# Patient Record
Sex: FEMALE | Race: WHITE | NOT HISPANIC OR LATINO | URBAN - METROPOLITAN AREA
[De-identification: names, ages, dates, MRNs, and addresses within clinical notes are randomized per-mention and may not be internally consistent; named-entity substitution may affect disease eponyms.]

---

## 2019-09-16 ENCOUNTER — INPATIENT (INPATIENT)
Facility: HOSPITAL | Age: 74
LOS: 3 days | Discharge: HOME | DRG: 175 | End: 2019-09-20
Payer: MEDICARE

## 2019-09-16 VITALS
SYSTOLIC BLOOD PRESSURE: 168 MMHG | HEART RATE: 102 BPM | RESPIRATION RATE: 17 BRPM | DIASTOLIC BLOOD PRESSURE: 91 MMHG | OXYGEN SATURATION: 95 %

## 2019-09-16 DIAGNOSIS — R65.10 SYSTEMIC INFLAMMATORY RESPONSE SYNDROME (SIRS) OF NON-INFECTIOUS ORIGIN WITHOUT ACUTE ORGAN DYSFUNCTION: ICD-10-CM

## 2019-09-16 DIAGNOSIS — Z90.710 ACQUIRED ABSENCE OF BOTH CERVIX AND UTERUS: Chronic | ICD-10-CM

## 2019-09-16 DIAGNOSIS — R09.89 OTHER SPECIFIED SYMPTOMS AND SIGNS INVOLVING THE CIRCULATORY AND RESPIRATORY SYSTEMS: ICD-10-CM

## 2019-09-16 DIAGNOSIS — I26.92 SADDLE EMBOLUS OF PULMONARY ARTERY WITHOUT ACUTE COR PULMONALE: ICD-10-CM

## 2019-09-16 DIAGNOSIS — I26.99 OTHER PULMONARY EMBOLISM WITHOUT ACUTE COR PULMONALE: ICD-10-CM

## 2019-09-16 DIAGNOSIS — J96.00 ACUTE RESPIRATORY FAILURE, UNSPECIFIED WHETHER WITH HYPOXIA OR HYPERCAPNIA: ICD-10-CM

## 2019-09-16 DIAGNOSIS — R63.8 OTHER SYMPTOMS AND SIGNS CONCERNING FOOD AND FLUID INTAKE: ICD-10-CM

## 2019-09-16 DIAGNOSIS — C55 MALIGNANT NEOPLASM OF UTERUS, PART UNSPECIFIED: ICD-10-CM

## 2019-09-16 DIAGNOSIS — Z90.49 ACQUIRED ABSENCE OF OTHER SPECIFIED PARTS OF DIGESTIVE TRACT: Chronic | ICD-10-CM

## 2019-09-16 DIAGNOSIS — R60.0 LOCALIZED EDEMA: ICD-10-CM

## 2019-09-16 DIAGNOSIS — I10 ESSENTIAL (PRIMARY) HYPERTENSION: ICD-10-CM

## 2019-09-16 LAB
ALBUMIN SERPL ELPH-MCNC: 3.3 G/DL — SIGNIFICANT CHANGE UP (ref 3.3–5)
ALP SERPL-CCNC: 95 U/L — SIGNIFICANT CHANGE UP (ref 40–120)
ALT FLD-CCNC: 13 U/L — SIGNIFICANT CHANGE UP (ref 10–45)
ANION GAP SERPL CALC-SCNC: 11 MMOL/L — SIGNIFICANT CHANGE UP (ref 5–17)
APTT BLD: 119.6 SEC — CRITICAL HIGH (ref 27.5–36.3)
APTT BLD: 37.2 SEC — HIGH (ref 27.5–36.3)
APTT BLD: 45 SEC — HIGH (ref 27.5–36.3)
APTT BLD: 70.4 SEC — HIGH (ref 27.5–36.3)
AST SERPL-CCNC: 14 U/L — SIGNIFICANT CHANGE UP (ref 10–40)
BASOPHILS # BLD AUTO: 0.02 K/UL — SIGNIFICANT CHANGE UP (ref 0–0.2)
BASOPHILS NFR BLD AUTO: 0.2 % — SIGNIFICANT CHANGE UP (ref 0–2)
BILIRUB SERPL-MCNC: 1 MG/DL — SIGNIFICANT CHANGE UP (ref 0.2–1.2)
BLD GP AB SCN SERPL QL: NEGATIVE — SIGNIFICANT CHANGE UP
BUN SERPL-MCNC: 16 MG/DL — SIGNIFICANT CHANGE UP (ref 7–23)
CALCIUM SERPL-MCNC: 8.6 MG/DL — SIGNIFICANT CHANGE UP (ref 8.4–10.5)
CHLORIDE SERPL-SCNC: 105 MMOL/L — SIGNIFICANT CHANGE UP (ref 96–108)
CHOLEST SERPL-MCNC: 121 MG/DL — SIGNIFICANT CHANGE UP (ref 10–199)
CK MB CFR SERPL CALC: 2.7 NG/ML — SIGNIFICANT CHANGE UP (ref 0–6.7)
CK SERPL-CCNC: 40 U/L — SIGNIFICANT CHANGE UP (ref 25–170)
CO2 SERPL-SCNC: 23 MMOL/L — SIGNIFICANT CHANGE UP (ref 22–31)
CREAT SERPL-MCNC: 0.94 MG/DL — SIGNIFICANT CHANGE UP (ref 0.5–1.3)
D DIMER BLD IA.RAPID-MCNC: 1850 NG/ML DDU — HIGH
EOSINOPHIL # BLD AUTO: 0.11 K/UL — SIGNIFICANT CHANGE UP (ref 0–0.5)
EOSINOPHIL NFR BLD AUTO: 1.1 % — SIGNIFICANT CHANGE UP (ref 0–6)
GLUCOSE SERPL-MCNC: 148 MG/DL — HIGH (ref 70–99)
HBA1C BLD-MCNC: 6.1 % — HIGH (ref 4–5.6)
HCT VFR BLD CALC: 40.4 % — SIGNIFICANT CHANGE UP (ref 34.5–45)
HDLC SERPL-MCNC: 44 MG/DL — LOW
HGB BLD-MCNC: 12.7 G/DL — SIGNIFICANT CHANGE UP (ref 11.5–15.5)
IMM GRANULOCYTES NFR BLD AUTO: 0.5 % — SIGNIFICANT CHANGE UP (ref 0–1.5)
INR BLD: 1.24 — HIGH (ref 0.88–1.16)
LACTATE SERPL-SCNC: 1.1 MMOL/L — SIGNIFICANT CHANGE UP (ref 0.5–2)
LIPID PNL WITH DIRECT LDL SERPL: 66 MG/DL — SIGNIFICANT CHANGE UP
LYMPHOCYTES # BLD AUTO: 1.32 K/UL — SIGNIFICANT CHANGE UP (ref 1–3.3)
LYMPHOCYTES # BLD AUTO: 13.1 % — SIGNIFICANT CHANGE UP (ref 13–44)
MAGNESIUM SERPL-MCNC: 1.7 MG/DL — SIGNIFICANT CHANGE UP (ref 1.6–2.6)
MCHC RBC-ENTMCNC: 27 PG — SIGNIFICANT CHANGE UP (ref 27–34)
MCHC RBC-ENTMCNC: 31.4 GM/DL — LOW (ref 32–36)
MCV RBC AUTO: 86 FL — SIGNIFICANT CHANGE UP (ref 80–100)
MONOCYTES # BLD AUTO: 0.83 K/UL — SIGNIFICANT CHANGE UP (ref 0–0.9)
MONOCYTES NFR BLD AUTO: 8.2 % — SIGNIFICANT CHANGE UP (ref 2–14)
NEUTROPHILS # BLD AUTO: 7.77 K/UL — HIGH (ref 1.8–7.4)
NEUTROPHILS NFR BLD AUTO: 76.9 % — SIGNIFICANT CHANGE UP (ref 43–77)
NRBC # BLD: 0 /100 WBCS — SIGNIFICANT CHANGE UP (ref 0–0)
NT-PROBNP SERPL-SCNC: 2630 PG/ML — HIGH (ref 0–300)
PHOSPHATE SERPL-MCNC: 3 MG/DL — SIGNIFICANT CHANGE UP (ref 2.5–4.5)
PLATELET # BLD AUTO: 166 K/UL — SIGNIFICANT CHANGE UP (ref 150–400)
POTASSIUM SERPL-MCNC: 3.9 MMOL/L — SIGNIFICANT CHANGE UP (ref 3.5–5.3)
POTASSIUM SERPL-SCNC: 3.9 MMOL/L — SIGNIFICANT CHANGE UP (ref 3.5–5.3)
PROT SERPL-MCNC: 6.8 G/DL — SIGNIFICANT CHANGE UP (ref 6–8.3)
PROTHROM AB SERPL-ACNC: 14.1 SEC — HIGH (ref 10–12.9)
RBC # BLD: 4.7 M/UL — SIGNIFICANT CHANGE UP (ref 3.8–5.2)
RBC # FLD: 14.6 % — HIGH (ref 10.3–14.5)
RH IG SCN BLD-IMP: POSITIVE — SIGNIFICANT CHANGE UP
SODIUM SERPL-SCNC: 139 MMOL/L — SIGNIFICANT CHANGE UP (ref 135–145)
TOTAL CHOLESTEROL/HDL RATIO MEASUREMENT: 2.8 RATIO — LOW (ref 3.3–7.1)
TRIGL SERPL-MCNC: 53 MG/DL — SIGNIFICANT CHANGE UP (ref 10–149)
TROPONIN T SERPL-MCNC: <0.01 NG/ML — SIGNIFICANT CHANGE UP (ref 0–0.01)
TSH SERPL-MCNC: 2.79 UIU/ML — SIGNIFICANT CHANGE UP (ref 0.35–4.94)
WBC # BLD: 10.1 K/UL — SIGNIFICANT CHANGE UP (ref 3.8–10.5)
WBC # FLD AUTO: 10.1 K/UL — SIGNIFICANT CHANGE UP (ref 3.8–10.5)

## 2019-09-16 PROCEDURE — 93306 TTE W/DOPPLER COMPLETE: CPT | Mod: 26

## 2019-09-16 PROCEDURE — 93010 ELECTROCARDIOGRAM REPORT: CPT | Mod: 76,77

## 2019-09-16 PROCEDURE — 99222 1ST HOSP IP/OBS MODERATE 55: CPT | Mod: 57

## 2019-09-16 PROCEDURE — 93010 ELECTROCARDIOGRAM REPORT: CPT

## 2019-09-16 PROCEDURE — 93970 EXTREMITY STUDY: CPT | Mod: 26,59

## 2019-09-16 PROCEDURE — 99223 1ST HOSP IP/OBS HIGH 75: CPT | Mod: GC

## 2019-09-16 PROCEDURE — 71045 X-RAY EXAM CHEST 1 VIEW: CPT | Mod: 26

## 2019-09-16 PROCEDURE — 93970 EXTREMITY STUDY: CPT | Mod: 26

## 2019-09-16 RX ORDER — ACETAMINOPHEN 500 MG
650 TABLET ORAL ONCE
Refills: 0 | Status: COMPLETED | OUTPATIENT
Start: 2019-09-16 | End: 2019-09-16

## 2019-09-16 RX ORDER — CALAMINE AND ZINC OXIDE AND PHENOL 160; 10 MG/ML; MG/ML
1 LOTION TOPICAL THREE TIMES A DAY
Refills: 0 | Status: DISCONTINUED | OUTPATIENT
Start: 2019-09-16 | End: 2019-09-20

## 2019-09-16 RX ORDER — HEPARIN SODIUM 5000 [USP'U]/ML
2600 INJECTION INTRAVENOUS; SUBCUTANEOUS
Qty: 25000 | Refills: 0 | Status: DISCONTINUED | OUTPATIENT
Start: 2019-09-16 | End: 2019-09-16

## 2019-09-16 RX ORDER — POTASSIUM CHLORIDE 20 MEQ
10 PACKET (EA) ORAL ONCE
Refills: 0 | Status: COMPLETED | OUTPATIENT
Start: 2019-09-16 | End: 2019-09-16

## 2019-09-16 RX ORDER — HEPARIN SODIUM 5000 [USP'U]/ML
2400 INJECTION INTRAVENOUS; SUBCUTANEOUS
Qty: 25000 | Refills: 0 | Status: DISCONTINUED | OUTPATIENT
Start: 2019-09-16 | End: 2019-09-16

## 2019-09-16 RX ORDER — MAGNESIUM SULFATE 500 MG/ML
2 VIAL (ML) INJECTION ONCE
Refills: 0 | Status: COMPLETED | OUTPATIENT
Start: 2019-09-16 | End: 2019-09-16

## 2019-09-16 RX ORDER — INFLUENZA VIRUS VACCINE 15; 15; 15; 15 UG/.5ML; UG/.5ML; UG/.5ML; UG/.5ML
0.5 SUSPENSION INTRAMUSCULAR ONCE
Refills: 0 | Status: COMPLETED | OUTPATIENT
Start: 2019-09-16 | End: 2019-09-16

## 2019-09-16 RX ORDER — HEPARIN SODIUM 5000 [USP'U]/ML
3000 INJECTION INTRAVENOUS; SUBCUTANEOUS
Qty: 25000 | Refills: 0 | Status: DISCONTINUED | OUTPATIENT
Start: 2019-09-16 | End: 2019-09-17

## 2019-09-16 RX ADMIN — HEPARIN SODIUM 24 UNIT(S)/HR: 5000 INJECTION INTRAVENOUS; SUBCUTANEOUS at 05:41

## 2019-09-16 RX ADMIN — Medication 650 MILLIGRAM(S): at 23:27

## 2019-09-16 RX ADMIN — Medication 50 GRAM(S): at 16:18

## 2019-09-16 RX ADMIN — Medication 650 MILLIGRAM(S): at 22:31

## 2019-09-16 RX ADMIN — Medication 10 MILLIEQUIVALENT(S): at 16:18

## 2019-09-16 NOTE — CONSULT NOTE ADULT - ASSESSMENT
74yoF with PMH of HTN (not on medications) and uterine CA (s/p CHRISTIE, chemotherapy, and radiation, last chemo tx 3-4yrs ago), and surgical hx of cholecystectomy, SBOx2, and CHRISTIE), who was transferred to [facility] from [facility] after found to have submassive PE on imaging.    -NPO after midnight for possible thrombectomy 9/17  -continue anticoagulation  -d/w attending 74yoF with PMH of HTN (not on medications) and uterine CA (s/p CHRISTIE, chemotherapy, and radiation, last chemo tx 3-4yrs ago), and surgical hx of cholecystectomy, SBOx2, and CHRISTIE), who was transferred to [facility] from [facility] after found to have submassive PE on imaging.    -NPO after midnight for possible thrombectomy 9/17  -continue anticoagulation  -f/u echo  -f/u LE doppler  -d/w attending

## 2019-09-16 NOTE — CONSULT NOTE ADULT - SUBJECTIVE AND OBJECTIVE BOX
Vascular Attending:  Micheal      HPI:  Mrs. Escobar is a 75yo F with PMH of HTN (not on medications) and uterine CA (s/p CHRISTIE, chemotherapy, and radiation, last chemo tx 3-4yrs ago), and surgical hx of cholecystectomy, SBOx2, and CHRISTIE), who was transferred to [facility] from [facility] after found to have submassive PE on imaging. Pt initially presented to [facility] overnight c/o 1 wk of SOB and fatigue, and stabbing back pain x1 day. This has never happened to her before. She endorsed that 1 wk ago she woke up with shortness of breath and a "fluttering feeling" in her chest. Intensity of SOB has been constant since onset, aggravated by movement, w/o alleviating factors. Pt reports feeling SOB upon ambulating to bathroom (at baseline can walk ~1 blocks before feeling SOB) . She reports that yesterday she began to have a stabbing feeling in her L shoulder blade like a knife radiating from back to front (assoc w/ inspiration, aggravated w/ movement, no alleviating factors), and came to the hospital at her family's insistence. She endorses increased b/l leg swelling from baseline. Pt denies recent travel, recent illness, sick contacts. ROS significant for diarrhea (1 episode yesterday, 1 episode today, non-bloody) and numbness & tingling in her hands and feet. In regards to PMH, pt has not been to a doctor in several years. She is not taking anti-hypertensives because she ran out of medicine and never followed up.     At [facility]:      -VS in ED: T101.3 rectal, , /90, RR35, O2sat 88% on RA. Placed on 2L NC with O2sat improving to 95%.     -Labs significant for WBC12.44 (w/ neutrophilic predominance), Hb/Hct 14.9/46.7, BUN/Cr 20/1.26. D-dimer 3129, Trop I 0.031, lactate 2.4. ABG pH 7.44, pCO2 30, pO2 77, HCO3 19.6.    -EKG w/ sinus tachycardia and RBBB    -CTA w/ large saddle embolus, signs of R heart strain, and signs of early pulmonary infarction    -For suspected sepsis, given ceftriaxone 2g, azithromycin 500mg, and 2.7L lactated ringers. Also given ASA 162mg, and tylenol 650mg. She was started on heparin prior to transfer to [facility], (16 Sep 2019 05:09)    Vascular consulted for submassive PE no imaging done in house.  Patient is on heparin drip.    PAST MEDICAL & SURGICAL HISTORY:  H/O small bowel obstruction: x2  Uterine cancer: s/p CHRISTIE, chemotherapy, radiation  HTN (hypertension)  S/P cholecystectomy  S/P total abdominal hysterectomy      MEDICATIONS  (STANDING):  heparin  Infusion. 2600 Unit(s)/Hr (26 mL/Hr) IV Continuous <Continuous>  influenza   Vaccine 0.5 milliLiter(s) IntraMuscular once  magnesium sulfate  IVPB 2 Gram(s) IV Intermittent once  potassium chloride    Tablet ER 10 milliEquivalent(s) Oral once    MEDICATIONS  (PRN):  calamine Lotion 1 Application(s) Topical three times a day PRN Rash and/or Itching      Allergies    No Known Allergies    Intolerances        SOCIAL HISTORY:    FAMILY HISTORY:  FHx: heart failure: mother, father  FHx: pancreatic cancer: brother,  age 58  FHx: thyroid cancer: mother  FHx: prostate cancer: father      Vital Signs Last 24 Hrs  T(C): 36.2 (16 Sep 2019 09:15), Max: 36.6 (16 Sep 2019 06:50)  T(F): 97.2 (16 Sep 2019 09:15), Max: 97.9 (16 Sep 2019 06:50)  HR: 92 (16 Sep 2019 08:40) (84 - 102)  BP: 167/109 (16 Sep 2019 08:40) (161/101 - 168/91)  BP(mean): 133 (16 Sep 2019 08:40) (123 - 133)  RR: 18 (16 Sep 2019 08:40) (17 - 18)  SpO2: 94% (16 Sep 2019 08:40) (94% - 95%)    PHYSICAL EXAM:      Constitutional:    Respiratory:    Cardiovascular:    Gastrointestinal:    Extremities:    Vascular:          LABS:                        12.7   10.10 )-----------( 166      ( 16 Sep 2019 05:47 )             40.4     09-16    139  |  105  |  16  ----------------------------<  148<H>  3.9   |  23  |  0.94    Ca    8.6      16 Sep 2019 05:47  Phos  3.0     -  Mg     1.7         TPro  6.8  /  Alb  3.3  /  TBili  1.0  /  DBili  x   /  AST  14  /  ALT  13  /  AlkPhos  95  -    PT/INR - ( 16 Sep 2019 05:47 )   PT: 14.1 sec;   INR: 1.24          PTT - ( 16 Sep 2019 10:45 )  PTT:45.0 sec      RADIOLOGY & ADDITIONAL STUDIES Vascular Attending:  Micheal      HPI:  Mrs. Escobar is a 73yo F with PMH of HTN (not on medications) and uterine CA (s/p CHRISTIE, chemotherapy, and radiation, last chemo tx 3-4yrs ago), and surgical hx of cholecystectomy, SBOx2, and CHRISTIE), who was transferred to [facility] from [facility] after found to have submassive PE on imaging. Pt initially presented to [facility] overnight c/o 1 wk of SOB and fatigue, and stabbing back pain x1 day. This has never happened to her before. She endorsed that 1 wk ago she woke up with shortness of breath and a "fluttering feeling" in her chest. Intensity of SOB has been constant since onset, aggravated by movement, w/o alleviating factors. Pt reports feeling SOB upon ambulating to bathroom (at baseline can walk ~1 blocks before feeling SOB) . She reports that yesterday she began to have a stabbing feeling in her L shoulder blade like a knife radiating from back to front (assoc w/ inspiration, aggravated w/ movement, no alleviating factors), and came to the hospital at her family's insistence. She endorses increased b/l leg swelling from baseline. Pt denies recent travel, recent illness, sick contacts. ROS significant for diarrhea (1 episode yesterday, 1 episode today, non-bloody) and numbness & tingling in her hands and feet. In regards to PMH, pt has not been to a doctor in several years. She is not taking anti-hypertensives because she ran out of medicine and never followed up.     At [facility]:      -VS in ED: T101.3 rectal, , /90, RR35, O2sat 88% on RA. Placed on 2L NC with O2sat improving to 95%.     -Labs significant for WBC12.44 (w/ neutrophilic predominance), Hb/Hct 14.9/46.7, BUN/Cr 20/1.26. D-dimer 3129, Trop I 0.031, lactate 2.4. ABG pH 7.44, pCO2 30, pO2 77, HCO3 19.6.    -EKG w/ sinus tachycardia and RBBB    -CTA w/ large saddle embolus, signs of R heart strain, and signs of early pulmonary infarction    -For suspected sepsis, given ceftriaxone 2g, azithromycin 500mg, and 2.7L lactated ringers. Also given ASA 162mg, and tylenol 650mg. She was started on heparin prior to transfer to [facility], (16 Sep 2019 05:09)    Vascular consulted for submassive PE no imaging done in house.  Patient is on heparin drip. Patient states she becomes SOB when doing anything active.  Patient denies cp/sob/n/v.    PAST MEDICAL & SURGICAL HISTORY:  H/O small bowel obstruction: x2  Uterine cancer: s/p CHRISTIE, chemotherapy, radiation  HTN (hypertension)  S/P cholecystectomy  S/P total abdominal hysterectomy      MEDICATIONS  (STANDING):  heparin  Infusion. 2600 Unit(s)/Hr (26 mL/Hr) IV Continuous <Continuous>  influenza   Vaccine 0.5 milliLiter(s) IntraMuscular once  magnesium sulfate  IVPB 2 Gram(s) IV Intermittent once  potassium chloride    Tablet ER 10 milliEquivalent(s) Oral once    MEDICATIONS  (PRN):  calamine Lotion 1 Application(s) Topical three times a day PRN Rash and/or Itching      Allergies    No Known Allergies    Intolerances        SOCIAL HISTORY:    FAMILY HISTORY:  FHx: heart failure: mother, father  FHx: pancreatic cancer: brother,  age 58  FHx: thyroid cancer: mother  FHx: prostate cancer: father      Vital Signs Last 24 Hrs  T(C): 36.2 (16 Sep 2019 09:15), Max: 36.6 (16 Sep 2019 06:50)  T(F): 97.2 (16 Sep 2019 09:15), Max: 97.9 (16 Sep 2019 06:50)  HR: 92 (16 Sep 2019 08:40) (84 - 102)  BP: 167/109 (16 Sep 2019 08:40) (161/101 - 168/91)  BP(mean): 133 (16 Sep 2019 08:40) (123 - 133)  RR: 18 (16 Sep 2019 08:40) (17 - 18)  SpO2: 94% (16 Sep 2019 08:40) (94% - 95%)    PHYSICAL EXAM:      Constitutional: NAD    Respiratory: nonlabored breathing on nasal canula     Cardiovascular: s1s2 rrr    Gastrointestinal: soft nt/nd    Extremities: b/l LE1 WWP swelling    Vascular: b/l LE 2+ pal DP triphasic PT          LABS:                        12.7   10.10 )-----------( 166      ( 16 Sep 2019 05:47 )             40.4     -    139  |  105  |  16  ----------------------------<  148<H>  3.9   |  23  |  0.94    Ca    8.6      16 Sep 2019 05:47  Phos  3.0     -  Mg     1.7         TPro  6.8  /  Alb  3.3  /  TBili  1.0  /  DBili  x   /  AST  14  /  ALT  13  /  AlkPhos  95  -    PT/INR - ( 16 Sep 2019 05:47 )   PT: 14.1 sec;   INR: 1.24          PTT - ( 16 Sep 2019 10:45 )  PTT:45.0 sec      RADIOLOGY & ADDITIONAL STUDIES

## 2019-09-16 NOTE — H&P ADULT - PROBLEM SELECTOR PLAN 5
F: Not indicated at this time  E: Replete PRN; K>4, Mg>2  N: NPO pending possible intervention    FULL CODE    DVT PPx: Heparin gtt

## 2019-09-16 NOTE — H&P ADULT - ASSESSMENT
Mrs. Escobar is a 73yo F with PMH of HTN (not on medications) and uterine CA (s/p CHRISTIE, chemotherapy, and radiation, last chemo tx 3-4yrs ago) who was transferred to [facility] from [facility] after found to have submassive PE on imaging. Pt initially presented to [facility] overnight c/o 1 wk of SOB and fatigue, and stabbing back pain x1 day. At [facility], treated w/ 2.7L IVF (LR), ceftriaxone 2g and azithromycin 500mg for suspected sepsis, as well as ASA 162mg and Tylenol 650mg. D dimer 3129. Found to have saddle pulmonary embolus on CTA, started on heparin gtt, and transferred to [facility] for medical management. Pt admitted to [facility] for telemetry monitoring and medical management of PE. Etiology of PE likely 2/2 DVT in the setting of LLE pain and swelling. Pt also w/ risk factors including decreased mobility, obesity, PMH of uterine CA.

## 2019-09-16 NOTE — H&P ADULT - NSHPPHYSICALEXAM_GEN_ALL_CORE
VITAL SIGNS:  T(C): --  HR: 102 (09-16-19 @ 04:45) (102 - 102)  BP: 168/91 (09-16-19 @ 04:45) (168/91 - 168/91)  RR: 17 (09-16-19 @ 04:45) (17 - 17)  SpO2: 95% (09-16-19 @ 04:45) (95% - 95%)    PHYSICAL EXAM:  Constitutional: obese, comfortable in bed; NAD  Neurologic: AAOx3  HEENT: NC/AT; EOMI, anicteric sclera; on nasal cannula at 2LPM, no nasal discharge; MMM, poor dentition  Neck: supple  Respiratory: CTA B/L, no wheezes/rales/rhonchi, no diminished breath sounds, unable to complete full sentences without stopping to catch her breath, no accessory muscle use  Cardiac: +S1/S2, no murmurs/rubs/gallops, RRR  Gastrointestinal: obese abdomen; tenderness to palpation in RLQ and LLQ, no rebound or guarding, no organomegaly, +BSx4; well-healed surgical scars  Genitourinary: no suprapubic tenderness, pirmafit in place  Extremities: 3+ LLE pedal edema including malleolus, RLE w/o pitting edema, LE tender to palpation b/l; RUE w/ erythema and redness nontender to palpation w/o fluctuance  Vascular: 2+ radial, femoral, DP/PT pulses B/L  Dermatologic: skin warm, dry and intact  Psychiatric: affect and characteristics of appearance, verbalizations, behaviors are appropriate

## 2019-09-16 NOTE — CONSULT NOTE ADULT - SUBJECTIVE AND OBJECTIVE BOX
PULMONARY SERVICE INITIAL CONSULT NOTE    HPI:  Mrs. Escobar is a 73yo F with PMH of HTN (not on medications) and uterine CA (s/p CHRISTIE, chemotherapy, and radiation, last chemo tx 3-4yrs ago), and surgical hx of cholecystectomy, SBOx2, and CHRISTIE), who was transferred to [facility] from [facility] after found to have submassive PE on imaging. Pt initially presented to [facility] overnight c/o 1 wk of SOB and fatigue, and stabbing back pain x1 day. This has never happened to her before. She endorsed that 1 wk ago she woke up with shortness of breath and a "fluttering feeling" in her chest. Intensity of SOB has been constant since onset, aggravated by movement, w/o alleviating factors. Pt reports feeling SOB upon ambulating to bathroom (at baseline can walk ~1 blocks before feeling SOB) . She reports that yesterday she began to have a stabbing feeling in her L shoulder blade like a knife radiating from back to front (assoc w/ inspiration, aggravated w/ movement, no alleviating factors), and came to the hospital at her family's insistence. She endorses increased b/l leg swelling from baseline. Pt denies recent travel, recent illness, sick contacts. ROS significant for diarrhea (1 episode yesterday, 1 episode today, non-bloody) and numbness & tingling in her hands and feet. In regards to PMH, pt has not been to a doctor in several years. She is not taking anti-hypertensives because she ran out of medicine and never followed up.     At [facility]:      -VS in ED: T101.3 rectal, , /90, RR35, O2sat 88% on RA. Placed on 2L NC with O2sat improving to 95%.     -Labs significant for WBC12.44 (w/ neutrophilic predominance), Hb/Hct 14.9/46.7, BUN/Cr 20/1.26. D-dimer 3129, Trop I 0.031, lactate 2.4. ABG pH 7.44, pCO2 30, pO2 77, HCO3 19.6.    -EKG w/ sinus tachycardia and RBBB    -CTA w/ large saddle embolus, signs of R heart strain, and signs of early pulmonary infarction    -For suspected sepsis, given ceftriaxone 2g, azithromycin 500mg, and 2.7L lactated ringers. Also given ASA 162mg, and tylenol 650mg. She was started on heparin prior to transfer to [facility], (16 Sep 2019 05:09)      REVIEW OF SYSTEMS:  Constitutional: No fever, weight loss or fatigue  Eyes: No eye pain, visual disturbances, or discharge  ENMT:  No difficulty hearing, tinnitus, vertigo; No sinus or throat pain  Neck: No pain, stiffness or neck swelling  Respiratory: see HPI  Cardiovascular: No chest pain, palpitations, dizziness or leg swelling  Gastrointestinal: No abdominal or epigastric pain. No nausea, vomiting or hematemesis; No diarrhea or constipation. No melena or hematochezia.  Genitourinary: No dysuria, frequency, hematuria or incontinence  Neurological: No headaches, memory loss, loss of strength, numbness or tremors  Skin: No itching, burning, rashes or lesions   Lymph Nodes: No enlarged glands  Endocrine: No heat or cold intolerance; No hair loss  Musculoskeletal: No joint pain or swelling; No muscle, back or extremity pain  Psychiatric: No depression, anxiety, mood swings or difficulty sleeping  Heme/Lymph: No easy bruising or bleeding gums  Allergy and Immunologic: No hives or eczema    PAST MEDICAL & SURGICAL HISTORY:  H/O small bowel obstruction: x2  Uterine cancer: s/p CHRISTIE, chemotherapy, radiation  HTN (hypertension)  S/P cholecystectomy  S/P total abdominal hysterectomy      FAMILY HISTORY:  FHx: heart failure: mother, father  FHx: pancreatic cancer: brother,  age 58  FHx: thyroid cancer: mother  FHx: prostate cancer: father      SOCIAL HISTORY:  Smoking Status: [ ] Current, [ ] Former, [ ] Never  Pack Years:    MEDICATIONS:  Pulmonary:    Antimicrobials:    Anticoagulants:    Onc:    GI/:    Endocrine:    Cardiac:    Other Medications:  calamine Lotion 1 Application(s) Topical three times a day PRN  influenza   Vaccine 0.5 milliLiter(s) IntraMuscular once  magnesium sulfate  IVPB 2 Gram(s) IV Intermittent once  potassium chloride    Tablet ER 10 milliEquivalent(s) Oral once      Allergies    No Known Allergies    Intolerances        Vital Signs Last 24 Hrs  T(C): 36.2 (16 Sep 2019 09:15), Max: 36.6 (16 Sep 2019 06:50)  T(F): 97.2 (16 Sep 2019 09:15), Max: 97.9 (16 Sep 2019 06:50)  HR: 92 (16 Sep 2019 08:40) (84 - 102)  BP: 167/109 (16 Sep 2019 08:40) (161/101 - 168/91)  BP(mean): 133 (16 Sep 2019 08:40) (123 - 133)  RR: 18 (16 Sep 2019 08:40) (17 - 18)  SpO2: 94% (16 Sep 2019 08:40) (94% - 95%)    09-15 @ 07:01  -   @ 07:00  --------------------------------------------------------  IN: 48 mL / OUT: 0 mL / NET: 48 mL          PHYSICAL EXAM:  Constitutional: WDWN  Head: NC/AT  EENT: PERRL, anicteric sclera; oropharynx clear, MMM  Neck: supple, no appreciable JVD  Respiratory: CTA B/L; no W/R/R  Cardiovascular: +S1/S2, RRR  Gastrointestinal: soft, NT/ND; +BSx4  Extremities: WWP; no edema, clubbing or cyanosis  Vascular: 2+ radial, DP/PT pulses B/L  Neurological: AAOx3; no focal deficits    LABS:      CBC Full  -  ( 16 Sep 2019 05:47 )  WBC Count : 10.10 K/uL  RBC Count : 4.70 M/uL  Hemoglobin : 12.7 g/dL  Hematocrit : 40.4 %  Platelet Count - Automated : 166 K/uL  Mean Cell Volume : 86.0 fl  Mean Cell Hemoglobin : 27.0 pg  Mean Cell Hemoglobin Concentration : 31.4 gm/dL  Auto Neutrophil # : 7.77 K/uL  Auto Lymphocyte # : 1.32 K/uL  Auto Monocyte # : 0.83 K/uL  Auto Eosinophil # : 0.11 K/uL  Auto Basophil # : 0.02 K/uL  Auto Neutrophil % : 76.9 %  Auto Lymphocyte % : 13.1 %  Auto Monocyte % : 8.2 %  Auto Eosinophil % : 1.1 %  Auto Basophil % : 0.2 %        139  |  105  |  16  ----------------------------<  148<H>  3.9   |  23  |  0.94    Ca    8.6      16 Sep 2019 05:47  Phos  3.0     -  Mg     1.7         TPro  6.8  /  Alb  3.3  /  TBili  1.0  /  DBili  x   /  AST  14  /  ALT  13  /  AlkPhos  95  -    PT/INR - ( 16 Sep 2019 05:47 )   PT: 14.1 sec;   INR: 1.24          PTT - ( 16 Sep 2019 10:45 )  PTT:45.0 sec                  RADIOLOGY & ADDITIONAL STUDIES:

## 2019-09-16 NOTE — H&P ADULT - PROBLEM SELECTOR PLAN 3
Pt with reported Hx of HTN, states she used to take medication, but stopped 2-3 years ago when she stopped seeing a doctor regularly. BP on arrival to [facility] 183/90.  - Monitor BP for now  - Would consider starting anti-hypertensive if HTN persists

## 2019-09-16 NOTE — H&P ADULT - HISTORY OF PRESENT ILLNESS
Mrs. Escobar is a 75yo F with PMH of HTN (not on medications) and uterine CA (s/p CHRISTIE, chemotherapy, and radiation, last chemo tx 3-4yrs ago), and surgical hx of cholecystectomy, SBOx2, and CHRISTIE), who was transferred to [facility] from [facility] after found to have submassive PE on imaging. Pt initially presented to [facility] overnight c/o 1 wk of SOB and fatigue, and stabbing back pain x1 day. This has never happened to her before. She endorsed that 1 wk ago she woke up with shortness of breath and a "fluttering feeling" in her chest. Intensity of SOB has been constant since onset, aggravated by movement, w/o alleviating factors. Pt reports feeling SOB upon ambulating to bathroom (at baseline can walk ~1 blocks before feeling SOB) . She reports that yesterday she began to have a stabbing feeling in her L shoulder blade like a knife radiating from back to front (assoc w/ inspiration, aggravated w/ movement, no alleviating factors), and came to the hospital at her family's insistence.    Pt endorses increased b/l leg swelling from baseline,     Pt denies recent travel, recent illness, sick contacts,   In regards to PMH, pt has not been to a doctor in several years. She is not taking anti-hypertensives because she ran out of medicine and never followed up.    At [facility]:      -VS in ED: T101.3 rectal, , /90, RR35, O2sat 88% on RA. Placed on 2L NC with O2sat improving to 95%.     -Labs significant for WBC12.44 (w/ neutrophilic predominance), Hb/Hct 14.9/46.7, BUN/Cr 20/1.26. D-dimer 3129, Trop I 0.031, lactate 2.4. ABG pH 7.44, pCO2 30, pO2 77, HCO3 19.6.    -EKG w/ sinus tachycardia and RBBB    -CTA w/ large saddle embolus, signs of R heart strain, and signs of early pulmonary infarction    -For suspected sepsis, given ceftriaxone 2g, azithromycin 500mg, and 2.7L lactated ringers. Also given ASA 162mg, and tylenol 650mg Mrs. Escobar is a 73yo F with PMH of HTN (not on medications) and uterine CA (s/p CHRISTIE, chemotherapy, and radiation, last chemo tx 3-4yrs ago), and surgical hx of cholecystectomy, SBOx2, and CHRISTIE), who was transferred to [facility] from [facility] after found to have submassive PE on imaging. Pt initially presented to [facility] overnight c/o 1 wk of SOB and fatigue, and stabbing back pain x1 day. This has never happened to her before. She endorsed that 1 wk ago she woke up with shortness of breath and a "fluttering feeling" in her chest. Intensity of SOB has been constant since onset, aggravated by movement, w/o alleviating factors. Pt reports feeling SOB upon ambulating to bathroom (at baseline can walk ~1 blocks before feeling SOB) . She reports that yesterday she began to have a stabbing feeling in her L shoulder blade like a knife radiating from back to front (assoc w/ inspiration, aggravated w/ movement, no alleviating factors), and came to the hospital at her family's insistence. She endorses increased b/l leg swelling from baseline. Pt denies recent travel, recent illness, sick contacts. ROS significant for diarrhea (1 episode yesterday, 1 episode today, non-bloody) and numbness & tingling in her hands and feet. In regards to PMH, pt has not been to a doctor in several years. She is not taking anti-hypertensives because she ran out of medicine and never followed up.     At [facility]:      -VS in ED: T101.3 rectal, , /90, RR35, O2sat 88% on RA. Placed on 2L NC with O2sat improving to 95%.     -Labs significant for WBC12.44 (w/ neutrophilic predominance), Hb/Hct 14.9/46.7, BUN/Cr 20/1.26. D-dimer 3129, Trop I 0.031, lactate 2.4. ABG pH 7.44, pCO2 30, pO2 77, HCO3 19.6.    -EKG w/ sinus tachycardia and RBBB    -CTA w/ large saddle embolus, signs of R heart strain, and signs of early pulmonary infarction    -For suspected sepsis, given ceftriaxone 2g, azithromycin 500mg, and 2.7L lactated ringers. Also given ASA 162mg, and tylenol 650mg. She was started on heparin prior to transfer to [facility],

## 2019-09-16 NOTE — H&P ADULT - PROBLEM SELECTOR PLAN 4
Hx of uterine cancer, underwent chemoradiation and hysterectomy 3-4 years ago. Has not followed with an oncologist since  - not currently an active issue

## 2019-09-16 NOTE — H&P ADULT - PROBLEM SELECTOR PLAN 2
Pt presented to [facility] meeting SIRS criteria with T 101.3 WBC 12.44, , lactate 2.4. Unclear source of infection during initial workup. Pt was given tylenol, zithromax, rocephin, asa, 2.7L LR. Though pt met SIRS criteria, the fever, leukocytosis, tachycardia and lactate are most likely secondary to PE  - would not continue antibiotics at this time without clear infectious source  - consider sepsis workup if pt spikes or develops signs/symptoms of infection  - f/u repeat lactate

## 2019-09-16 NOTE — H&P ADULT - NSHPLABSRESULTS_GEN_ALL_CORE
LABS FROM LABS:                        12.7   10.10 )-----------( 166      ( 16 Sep 2019 05:47 )             40.4       Lactate: 1.1 LABS:                        12.7   10.10 )-----------( 166      ( 16 Sep 2019 05:47 )             40.4     09-16    139  |  105  |  16  ----------------------------<  148<H>  3.9   |  23  |  0.94    Ca    8.6      16 Sep 2019 05:47  Phos  3.0     09-16  Mg     1.7     09-16    TPro  6.8  /  Alb  3.3  /  TBili  1.0  /  DBili  x   /  AST  14  /  ALT  13  /  AlkPhos  95  09-16      PT/INR - ( 16 Sep 2019 05:47 )   PT: 14.1 sec;   INR: 1.24     PTT - ( 16 Sep 2019 05:47 )  PTT:119.6 sec      CARDIAC MARKERS ( 16 Sep 2019 05:47 )  x     / <0.01 ng/mL / 40 U/L / x     / 2.7 ng/mL      Lactate, Blood: 1.1 mmoL/L (09.16.19 @ 05:46)      RADIOLOGY, EKG AND ADDITIONAL TESTS: Reviewed. LABS:                        12.7   10.10 )-----------( 166      ( 16 Sep 2019 05:47 )             40.4     09-16    139  |  105  |  16  ----------------------------<  148<H>  3.9   |  23  |  0.94    Ca    8.6      16 Sep 2019 05:47  Phos  3.0     09-16  Mg     1.7     09-16    TPro  6.8  /  Alb  3.3  /  TBili  1.0  /  DBili  x   /  AST  14  /  ALT  13  /  AlkPhos  95  09-16      PT/INR - ( 16 Sep 2019 05:47 )   PT: 14.1 sec;   INR: 1.24     PTT - ( 16 Sep 2019 05:47 )  PTT:119.6 sec      CARDIAC MARKERS ( 16 Sep 2019 05:47 )  x     / <0.01 ng/mL / 40 U/L / x     / 2.7 ng/mL      Lactate, Blood: 1.1 mmoL/L (09.16.19 @ 05:46)    Serum Pro-Brain Natriuretic Peptide (09.16.19 @ 05:47): 2630      RADIOLOGY, EKG AND ADDITIONAL TESTS: Reviewed.

## 2019-09-16 NOTE — H&P ADULT - NSICDXFAMILYHX_GEN_ALL_CORE_FT
FAMILY HISTORY:  FHx: heart failure, mother, father  FHx: pancreatic cancer, brother,  age 58  FHx: prostate cancer, father  FHx: thyroid cancer, mother

## 2019-09-16 NOTE — CONSULT NOTE ADULT - PROBLEM SELECTOR RECOMMENDATION 3
high suspicion that etiology of VTE might be related to her hx of cancer perhaps recurrance now. would need a follow up with heme onc.

## 2019-09-16 NOTE — PROGRESS NOTE ADULT - PROBLEM SELECTOR PLAN 1
Pt presenting from [facility] with CT finding of acute saddle PE w/ signs of R heart strain and early pulmonary infarct in the setting of one week of dyspnea on exertion and a sharp pain radiating from the back of the R shoulder through to the chest. D-dimer at [facility] 3129. Pt was started on heparin at an unclear rate and transferred to [facility] for further management. Pt w/ hx of uterine cancer, for which she underwent chemoradiation and a hysterectomy. On admission to [facility], pt with  Hx of malignancy, and Wells score 2.5. Pt denies recent surgery, trauma, kidney disease, liver disease, recent travel, prolonged immobility, history of PE/DVT, or use of medications that could increase risk of DVT/PE. Risk factors include obesity and history of malignancy.  -elevated BNP likely 2/2 R heart strain  - no repeat CTA PE protocol needed here  - b/l duplex both lower and upper extremities ordered, pending results  - started on heparin at 24mL/hr, increased up to 30cc/hr tonight given subtherapeutic PTTs  - trend PTT q4-6  - monitor closely for HD changes  - pulm and vascular consulted; pt NPO after midnight for potential thrombectomy the next morning  - TTE performed 9/16; LVEF 55%, no R heart strain, normal RA and RV size, normal PASP  - continue supplemental O2 with NC to maintain O2 >94%

## 2019-09-16 NOTE — PROGRESS NOTE ADULT - ASSESSMENT
Mrs. Escobar is a 73yo F with PMH of HTN (not on medications) and uterine CA (s/p CHRISTIE, chemotherapy, and radiation, last chemo tx 3-4yrs ago) who was transferred to [facility] from [facility] after found to have submassive PE on imaging. Pt initially presented to [facility] overnight c/o 1 wk of SOB and fatigue, and stabbing back pain x1 day. At [facility], treated w/ 2.7L IVF (LR), ceftriaxone 2g and azithromycin 500mg for suspected sepsis, as well as ASA 162mg and Tylenol 650mg. D dimer 3129. Found to have saddle pulmonary embolus on CTA, started on heparin gtt, and transferred to [facility] for medical management. Pt admitted to [facility] for telemetry monitoring and medical management of PE. Etiology of PE likely 2/2 DVT in the setting of LLE pain and swelling. Pt also w/ risk factors including decreased mobility, obesity, PMH of uterine CA. 73yo F with PMH of HTN (not on medications) and uterine CA (s/p CHRISTIE, chemotherapy, and radiation, last chemo tx 3-4yrs ago) who was transferred to [facility] from [facility] after found to have submassive PE on imaging. Started on heparin gtt, and transferred to [facility] for medical management. Pt admitted to [facility] for telemetry monitoring and medical management of PE.

## 2019-09-16 NOTE — H&P ADULT - NSICDXPASTMEDICALHX_GEN_ALL_CORE_FT
PAST MEDICAL HISTORY:  H/O small bowel obstruction x2    HTN (hypertension)     Uterine cancer s/p CHRISTIE, chemotherapy, radiation

## 2019-09-16 NOTE — PROGRESS NOTE ADULT - SUBJECTIVE AND OBJECTIVE BOX
SUBJECTIVE/INTERVAL HPI:  Saw and examined patient at bedside this AM. Patient reports no new complaints. She still reports pleuritic chest pain on deep inspiration and L shoulder pain. Still endorses lower extremity pain with swelling. Patient notes that the lower extremity edema started after her radiation for uterine cancer in 2015. No CP at rest, fever, chills, cough, dysuria.    VITAL SIGNS:  Vital Signs Last 24 Hrs  T(C): 36.6 (16 Sep 2019 06:50), Max: 36.6 (16 Sep 2019 06:50)  T(F): 97.9 (16 Sep 2019 06:50), Max: 97.9 (16 Sep 2019 06:50)  HR: 102 (16 Sep 2019 04:45) (102 - 102)  BP: 168/91 (16 Sep 2019 04:45) (168/91 - 168/91)  BP(mean): 123 (16 Sep 2019 04:45) (123 - 123)  RR: 17 (16 Sep 2019 04:45) (17 - 17)  SpO2: 95% (16 Sep 2019 04:45) (95% - 95%)    PHYSICAL EXAM:    General: in NAD, lying comfortably in bed  HEENT: normocephalic, atraumatic; PERRL, anicteric sclera; MMM  Neck: supple, no JVD, no thyromegaly, no lymphadenopathy  Cardiovascular: +S1/S2, RRR, no M/G/R  Respiratory: clear to auscultation B/L; no wheezing, no rales, no rhonchi  Gastrointestinal: soft, NT/ND; +BSx4, no organomegaly  Extremities: WWP; no edema, clubbing or cyanosis  Vascular: 2+ radial, DP/PT pulses B/L  Neurological: AAOx3; no focal deficits    MEDICATIONS:  MEDICATIONS  (STANDING):  heparin  Infusion 2400 Unit(s)/Hr (24 mL/Hr) IV Continuous <Continuous>  influenza   Vaccine 0.5 milliLiter(s) IntraMuscular once    MEDICATIONS  (PRN):  calamine Lotion 1 Application(s) Topical three times a day PRN Rash and/or Itching      ALLERGIES:  Allergies    No Known Allergies    Intolerances        LABS:                        12.7   10.10 )-----------( 166      ( 16 Sep 2019 05:47 )             40.4     09-16    139  |  105  |  16  ----------------------------<  148<H>  3.9   |  23  |  0.94    Ca    8.6      16 Sep 2019 05:47  Phos  3.0     09-16  Mg     1.7     09-16    TPro  6.8  /  Alb  3.3  /  TBili  1.0  /  DBili  x   /  AST  14  /  ALT  13  /  AlkPhos  95  09-16    PT/INR - ( 16 Sep 2019 05:47 )   PT: 14.1 sec;   INR: 1.24          PTT - ( 16 Sep 2019 05:47 )  PTT:119.6 sec    CAPILLARY BLOOD GLUCOSE          RADIOLOGY & ADDITIONAL TESTS: Reviewed. SUBJECTIVE/INTERVAL HPI:  Saw and examined patient at bedside this AM. Patient reports no new complaints. She still reports pleuritic chest pain on deep inspiration and L shoulder pain. Still endorses lower extremity pain with swelling. Patient notes that the lower extremity edema started after her radiation for uterine cancer in 2015. No CP at rest, fever, chills, cough, dysuria.    VITAL SIGNS:  Vital Signs Last 24 Hrs  T(C): 36.6 (16 Sep 2019 06:50), Max: 36.6 (16 Sep 2019 06:50)  T(F): 97.9 (16 Sep 2019 06:50), Max: 97.9 (16 Sep 2019 06:50)  HR: 102 (16 Sep 2019 04:45) (102 - 102)  BP: 168/91 (16 Sep 2019 04:45) (168/91 - 168/91)  BP(mean): 123 (16 Sep 2019 04:45) (123 - 123)  RR: 17 (16 Sep 2019 04:45) (17 - 17)  SpO2: 95% (16 Sep 2019 04:45) (95% - 95%)    PHYSICAL EXAM:  Constitutional: WDWN, lying comfortably in bed  HEENT: NC/AT, PERRL, anicteric sclera; oropharynx clear, MMM  Neck: supple, no appreciable JVD  Respiratory: CTA B/L; no W/R/R  Cardiovascular: +S1/S2, RRR  Gastrointestinal: soft, obese abdomen, midline scar from prior SBO surgery; NT/ND; +BSx4  Extremities: WWP; 2+ pitting edema in b/l lower extremities, L lower extremity warm to touch, erythematous on dorsal surface; R lower extremity nonerythematous, cooler to touch compared to L  Vascular: 1+ radial, DP pulses B/L  Neurological: AAOx3; no focal deficits    MEDICATIONS:  MEDICATIONS  (STANDING):  heparin  Infusion 2400 Unit(s)/Hr (24 mL/Hr) IV Continuous <Continuous>  influenza   Vaccine 0.5 milliLiter(s) IntraMuscular once    MEDICATIONS  (PRN):  calamine Lotion 1 Application(s) Topical three times a day PRN Rash and/or Itching      ALLERGIES:  Allergies    No Known Allergies    Intolerances        LABS:                        12.7   10.10 )-----------( 166      ( 16 Sep 2019 05:47 )             40.4     09-16    139  |  105  |  16  ----------------------------<  148<H>  3.9   |  23  |  0.94    Ca    8.6      16 Sep 2019 05:47  Phos  3.0     09-16  Mg     1.7     09-16    TPro  6.8  /  Alb  3.3  /  TBili  1.0  /  DBili  x   /  AST  14  /  ALT  13  /  AlkPhos  95  09-16    PT/INR - ( 16 Sep 2019 05:47 )   PT: 14.1 sec;   INR: 1.24          PTT - ( 16 Sep 2019 05:47 )  PTT:119.6 sec    CAPILLARY BLOOD GLUCOSE          RADIOLOGY & ADDITIONAL TESTS: Reviewed.

## 2019-09-16 NOTE — CONSULT NOTE ADULT - PROBLEM SELECTOR RECOMMENDATION 9
Submassive PE, Bilateral / Saddle, Hemodynamically stable.   Etiology: HX of malignancy with no follow up for last 4 years. Obese but independent in ADL's, former smoker, post menopausal, No recent surgery, travel.  CE negative, BNP elevated, EKG : RBBB  Tachy @ admission to OSH, hypoxic on minimal exertion otherwise on 2 L NC.  ECHO shows no RH dilation, PASP normal.   - C/w heparin drip.   - Vascular following : possible thrombectomy tomorrow, keep NPO overnight.   - high suspicion that etiology of VTE might be related to her hx of cancer perhaps recurrance now. would need a follow up with heme onc.   - continue supplemental O2 with NC to maintain O2 >94%.

## 2019-09-16 NOTE — CONSULT NOTE ADULT - ASSESSMENT
This is a 73yo F with medical hx significant for HTN, Uterine CA s/p CHRISTIE, chemotherapy, and radiation x 4 yrs ago, presented to OSH with SOB , found to have Acute submassive PE, transferred to [facility] for further management on 09/16.

## 2019-09-16 NOTE — H&P ADULT - PROBLEM SELECTOR PLAN 1
Pt presenting from [facility] with CT finding of acute saddle PE w/ signs of R heart strain and early pulmonary infarct in the setting of one week of dyspnea on exertion and a sharp pain radiating from the back of the R shoulder through to the chest. D-dimer at [facility] 3129. Pt was started on heparin at an unclear rate and transferred to [facility] for further management. Her only clear risk factor is a history of uterine cancer, for which she underwent chemoradiation and a hysterectomy. On admission to [facility], pt with  Hx of malignancy, and Wells score 2.5. Pt denies recent surgery, trauma, kidney disease, liver disease, recent travel, prolonged immobility, history of PE/DVT, or use of medications that could increase risk of DVT/PE. Risk factors include obesity and history of malignancy.  - CTA PE protocol ordered - assess need for repeat given findings of CT at [facility] [place]  - b/l duplex both lower and upper extremities ordered  - started on heparin at 24mL/hr  - trend PTT q4-6  - monitor closely for HD changes  - consult pulm in AM  - echocardiogram ordered  - continue supplemental O2 with NC to maintain O2 >94% Pt presenting from [facility] with CT finding of acute saddle PE w/ signs of R heart strain and early pulmonary infarct in the setting of one week of dyspnea on exertion and a sharp pain radiating from the back of the R shoulder through to the chest. D-dimer at [facility] 3129. Pt was started on heparin at an unclear rate and transferred to [facility] for further management. Pt w/ hx of uterine cancer, for which she underwent chemoradiation and a hysterectomy. On admission to [facility], pt with  Hx of malignancy, and Wells score 2.5. Pt denies recent surgery, trauma, kidney disease, liver disease, recent travel, prolonged immobility, history of PE/DVT, or use of medications that could increase risk of DVT/PE. Risk factors include obesity and history of malignancy.  - CTA PE protocol ordered - assess need for repeat given findings of CT at [facility]  - b/l duplex both lower and upper extremities ordered  - started on heparin at 24mL/hr  - trend PTT q4-6  - monitor closely for HD changes  - consult pulm in AM  - echocardiogram ordered  - continue supplemental O2 with NC to maintain O2 >94% Pt presenting from [facility] with CT finding of acute saddle PE w/ signs of R heart strain and early pulmonary infarct in the setting of one week of dyspnea on exertion and a sharp pain radiating from the back of the R shoulder through to the chest. D-dimer at [facility] 3129. Pt was started on heparin at an unclear rate and transferred to [facility] for further management. Pt w/ hx of uterine cancer, for which she underwent chemoradiation and a hysterectomy. On admission to [facility], pt with  Hx of malignancy, and Wells score 2.5. Pt denies recent surgery, trauma, kidney disease, liver disease, recent travel, prolonged immobility, history of PE/DVT, or use of medications that could increase risk of DVT/PE. Risk factors include obesity and history of malignancy.  -elevated BNP likely 2/2 R heart strain  - CTA PE protocol ordered - assess need for repeat given findings of CT at [facility]  - b/l duplex both lower and upper extremities ordered  - started on heparin at 24mL/hr  - trend PTT q4-6  - monitor closely for HD changes  - consult pulm in AM  - echocardiogram ordered  - continue supplemental O2 with NC to maintain O2 >94%

## 2019-09-16 NOTE — PATIENT PROFILE ADULT - JOB HELP
Phone call from patient's daughter, Susana Matson. Patient has developed a yeast infection under her breasts. Due to her breasts laying on her stomach, the rash goes down to her waist. Susana Matson had some Nystatin powder and ointment. She says the ointment covers better. She didn't have much left. Asking if a script could be sent to [facility]. Brody Calvert for patient.
no

## 2019-09-16 NOTE — H&P ADULT - ATTENDING COMMENTS
Patietn seen in am at bedside.  Kannan krause , CLARICE satble, HR dcresing to 80s, BP greater than 160.  Chest clear, romulo, ab soft nontender with bs, edema L eg.    Labs as above    A- submassive PE/morbid obesity/HTN/ uterine ca    Suggest continue heparin, adjust dose for  aPtt> 60 secs  PERT team fu  hold BP meds  echo  dupplex of lower extremities  would not continue ABX as above

## 2019-09-16 NOTE — CONSULT NOTE ADULT - ATTENDING COMMENTS
Case reviewed and patient examined    Has large saddle PE on CT    No right heart strain on ECHO, and normal Trp, however hypoxic and unable to ambulate    Discussion with Pulm who are requesting catheter directed lysis    Risks of bleeding discussed with the patient
Patient seen and examined with house-staff during bedside rounds.  Resident note read, including vitals, physical findings, laboratory data, and radiological reports.   Revisions included below.  Direct personal management at bed side and extensive interpretation of the data.  Plan was outlined and discussed in details with the housestaff.  Decision making of high complexity  Action taken for acute disease activity to reflect the level of care provided:  - medication reconciliation  - review laboratory data  acute respiratory failure saddle pulmonary emboli DVT hypoxemia  - heparin  - vascular consult  - may need CDI

## 2019-09-17 LAB
ANION GAP SERPL CALC-SCNC: 10 MMOL/L — SIGNIFICANT CHANGE UP (ref 5–17)
APTT BLD: 147.7 SEC — CRITICAL HIGH (ref 27.5–36.3)
APTT BLD: 29.9 SEC — SIGNIFICANT CHANGE UP (ref 27.5–36.3)
APTT BLD: 32.2 SEC — SIGNIFICANT CHANGE UP (ref 27.5–36.3)
APTT BLD: 40.8 SEC — HIGH (ref 27.5–36.3)
APTT BLD: 98.7 SEC — HIGH (ref 27.5–36.3)
BLD GP AB SCN SERPL QL: NEGATIVE — SIGNIFICANT CHANGE UP
BUN SERPL-MCNC: 13 MG/DL — SIGNIFICANT CHANGE UP (ref 7–23)
CALCIUM SERPL-MCNC: 8.7 MG/DL — SIGNIFICANT CHANGE UP (ref 8.4–10.5)
CHLORIDE SERPL-SCNC: 103 MMOL/L — SIGNIFICANT CHANGE UP (ref 96–108)
CO2 SERPL-SCNC: 24 MMOL/L — SIGNIFICANT CHANGE UP (ref 22–31)
CREAT SERPL-MCNC: 0.93 MG/DL — SIGNIFICANT CHANGE UP (ref 0.5–1.3)
FIBRINOGEN PPP-MCNC: 402 MG/DL — SIGNIFICANT CHANGE UP (ref 258–438)
FIBRINOGEN PPP-MCNC: 490 MG/DL — HIGH (ref 258–438)
FIBRINOGEN PPP-MCNC: 498 MG/DL — HIGH (ref 258–438)
GLUCOSE SERPL-MCNC: 140 MG/DL — HIGH (ref 70–99)
HCT VFR BLD CALC: 39 % — SIGNIFICANT CHANGE UP (ref 34.5–45)
HCT VFR BLD CALC: 40.1 % — SIGNIFICANT CHANGE UP (ref 34.5–45)
HCV AB S/CO SERPL IA: 0.15 S/CO — SIGNIFICANT CHANGE UP
HCV AB SERPL-IMP: SIGNIFICANT CHANGE UP
HGB BLD-MCNC: 12.4 G/DL — SIGNIFICANT CHANGE UP (ref 11.5–15.5)
HGB BLD-MCNC: 12.6 G/DL — SIGNIFICANT CHANGE UP (ref 11.5–15.5)
INR BLD: 1.18 — HIGH (ref 0.88–1.16)
INR BLD: 1.18 — HIGH (ref 0.88–1.16)
INR BLD: 1.22 — HIGH (ref 0.88–1.16)
MAGNESIUM SERPL-MCNC: 2 MG/DL — SIGNIFICANT CHANGE UP (ref 1.6–2.6)
MCHC RBC-ENTMCNC: 27.4 PG — SIGNIFICANT CHANGE UP (ref 27–34)
MCHC RBC-ENTMCNC: 27.7 PG — SIGNIFICANT CHANGE UP (ref 27–34)
MCHC RBC-ENTMCNC: 31.4 GM/DL — LOW (ref 32–36)
MCHC RBC-ENTMCNC: 31.8 GM/DL — LOW (ref 32–36)
MCV RBC AUTO: 86.1 FL — SIGNIFICANT CHANGE UP (ref 80–100)
MCV RBC AUTO: 88.1 FL — SIGNIFICANT CHANGE UP (ref 80–100)
NRBC # BLD: 0 /100 WBCS — SIGNIFICANT CHANGE UP (ref 0–0)
NRBC # BLD: 0 /100 WBCS — SIGNIFICANT CHANGE UP (ref 0–0)
PHOSPHATE SERPL-MCNC: 3.8 MG/DL — SIGNIFICANT CHANGE UP (ref 2.5–4.5)
PLATELET # BLD AUTO: 172 K/UL — SIGNIFICANT CHANGE UP (ref 150–400)
PLATELET # BLD AUTO: 189 K/UL — SIGNIFICANT CHANGE UP (ref 150–400)
POTASSIUM SERPL-MCNC: 3.8 MMOL/L — SIGNIFICANT CHANGE UP (ref 3.5–5.3)
POTASSIUM SERPL-SCNC: 3.8 MMOL/L — SIGNIFICANT CHANGE UP (ref 3.5–5.3)
PROTHROM AB SERPL-ACNC: 13.4 SEC — HIGH (ref 10–12.9)
PROTHROM AB SERPL-ACNC: 13.4 SEC — HIGH (ref 10–12.9)
PROTHROM AB SERPL-ACNC: 13.9 SEC — HIGH (ref 10–12.9)
RBC # BLD: 4.53 M/UL — SIGNIFICANT CHANGE UP (ref 3.8–5.2)
RBC # BLD: 4.55 M/UL — SIGNIFICANT CHANGE UP (ref 3.8–5.2)
RBC # FLD: 14.6 % — HIGH (ref 10.3–14.5)
RBC # FLD: 14.7 % — HIGH (ref 10.3–14.5)
RH IG SCN BLD-IMP: POSITIVE — SIGNIFICANT CHANGE UP
SODIUM SERPL-SCNC: 137 MMOL/L — SIGNIFICANT CHANGE UP (ref 135–145)
WBC # BLD: 7.33 K/UL — SIGNIFICANT CHANGE UP (ref 3.8–10.5)
WBC # BLD: 7.64 K/UL — SIGNIFICANT CHANGE UP (ref 3.8–10.5)
WBC # FLD AUTO: 7.33 K/UL — SIGNIFICANT CHANGE UP (ref 3.8–10.5)
WBC # FLD AUTO: 7.64 K/UL — SIGNIFICANT CHANGE UP (ref 3.8–10.5)

## 2019-09-17 PROCEDURE — 37211 THROMBOLYTIC ART THERAPY: CPT | Mod: GC

## 2019-09-17 PROCEDURE — 99233 SBSQ HOSP IP/OBS HIGH 50: CPT | Mod: GC

## 2019-09-17 RX ORDER — ALTEPLASE 100 MG
0.1 KIT INTRAVENOUS
Qty: 10 | Refills: 0 | Status: DISCONTINUED | OUTPATIENT
Start: 2019-09-17 | End: 2019-09-17

## 2019-09-17 RX ORDER — ALTEPLASE 100 MG
0.5 KIT INTRAVENOUS
Qty: 10 | Refills: 0 | Status: DISCONTINUED | OUTPATIENT
Start: 2019-09-17 | End: 2019-09-18

## 2019-09-17 RX ORDER — ALTEPLASE 100 MG
1 KIT INTRAVENOUS
Qty: 10 | Refills: 0 | Status: DISCONTINUED | OUTPATIENT
Start: 2019-09-17 | End: 2019-09-18

## 2019-09-17 RX ORDER — POTASSIUM CHLORIDE 20 MEQ
20 PACKET (EA) ORAL ONCE
Refills: 0 | Status: COMPLETED | OUTPATIENT
Start: 2019-09-17 | End: 2019-09-17

## 2019-09-17 RX ORDER — ACETAMINOPHEN 500 MG
650 TABLET ORAL ONCE
Refills: 0 | Status: COMPLETED | OUTPATIENT
Start: 2019-09-17 | End: 2019-09-17

## 2019-09-17 RX ORDER — IPRATROPIUM/ALBUTEROL SULFATE 18-103MCG
3 AEROSOL WITH ADAPTER (GRAM) INHALATION ONCE
Refills: 0 | Status: COMPLETED | OUTPATIENT
Start: 2019-09-17 | End: 2019-09-17

## 2019-09-17 RX ORDER — HEPARIN SODIUM 5000 [USP'U]/ML
500 INJECTION INTRAVENOUS; SUBCUTANEOUS
Qty: 25000 | Refills: 0 | Status: DISCONTINUED | OUTPATIENT
Start: 2019-09-17 | End: 2019-09-18

## 2019-09-17 RX ORDER — HEPARIN SODIUM 5000 [USP'U]/ML
2700 INJECTION INTRAVENOUS; SUBCUTANEOUS
Qty: 25000 | Refills: 0 | Status: DISCONTINUED | OUTPATIENT
Start: 2019-09-17 | End: 2019-09-17

## 2019-09-17 RX ORDER — ALTEPLASE 100 MG
0.25 KIT INTRAVENOUS
Qty: 10 | Refills: 0 | Status: DISCONTINUED | OUTPATIENT
Start: 2019-09-17 | End: 2019-09-18

## 2019-09-17 RX ADMIN — HEPARIN SODIUM 5 UNIT(S)/HR: 5000 INJECTION INTRAVENOUS; SUBCUTANEOUS at 12:05

## 2019-09-17 RX ADMIN — ALTEPLASE 5 MG/HR: KIT at 21:50

## 2019-09-17 RX ADMIN — Medication 20 MILLIEQUIVALENT(S): at 06:55

## 2019-09-17 RX ADMIN — HEPARIN SODIUM 27 UNIT(S)/HR: 5000 INJECTION INTRAVENOUS; SUBCUTANEOUS at 05:13

## 2019-09-17 RX ADMIN — Medication 650 MILLIGRAM(S): at 23:11

## 2019-09-17 RX ADMIN — ALTEPLASE 10 MG/HR: KIT at 12:00

## 2019-09-17 RX ADMIN — Medication 3 MILLILITER(S): at 06:54

## 2019-09-17 RX ADMIN — Medication 650 MILLIGRAM(S): at 22:19

## 2019-09-17 NOTE — PROGRESS NOTE ADULT - SUBJECTIVE AND OBJECTIVE BOX
[facility] TO MICU TRANSFER NOTE    HOSPITAL COURSE:  Mrs. Escobar is a 73yo F with PMH of HTN (not on medications) and uterine CA (s/p CHRISTIE, chemotherapy, and radiation, last chemo tx 3-4yrs ago), and surgical hx of cholecystectomy, SBOx2, and CHRISTIE), who was transferred to [facility] from [facility] after found to have submassive PE on imaging. Pt initially presented to [facility] overnight c/o 1 wk of SOB and fatigue, and stabbing back pain x1 day. At [facility], T101.3 rectal, , /90, RR35, O2sat 88% on RA. Placed on 2L NC with O2sat improving to 95%. Labs significant for WBC12.44 (w/ neutrophilic predominance), Hb/Hct 14.9/46.7, BUN/Cr 20/1.26. D-dimer 3129, Trop I 0.031, lactate 2.4. ABG pH 7.44, pCO2 30, pO2 77, HCO3 19.6. EKG w/ sinus tachycardia and RBBB. CTA w/ large saddle embolus, signs of R heart strain, and signs of early pulmonary infarction. For suspected sepsis, given ceftriaxone 2g, azithromycin 500mg, and 2.7L lactated ringers. Also given ASA 162mg, and tylenol 650mg.  She was started on heparin prior to transfer to [facility]. Upon arrival to [facility], patient has been hemodynamically stable, sating well 92-98 on 2L NC, HR 70s-80s, BPs 140s-170s. Hep gtt rate adjusted here per q6h PTT. TTE showed LVEF 55% without dilatation of R heart. Lower extremity doppler significant for L lower extremity partially occlusive and occlusive thrombus involving L common femoral, femoral and popliteal veins. Patient was transferred to MICU on 9/17 for catheter-directed lysis of saddle embolus.      SUBJECTIVE/INTERVAL HPI:  Saw and examined patient at bedside this AM. Patient reports some wheezing she noticed this morning and received duonebs x1. No CP, SOB, fever, abdominal pain.    VITAL SIGNS:  Vital Signs Last 24 Hrs  T(C): 36.7 (17 Sep 2019 10:08), Max: 36.7 (17 Sep 2019 10:08)  T(F): 98.1 (17 Sep 2019 10:08), Max: 98.1 (17 Sep 2019 10:08)  HR: 86 (17 Sep 2019 08:49) (76 - 86)  BP: 162/91 (17 Sep 2019 08:49) (140/83 - 170/99)  BP(mean): 121 (17 Sep 2019 08:49) (95 - 128)  RR: 17 (17 Sep 2019 08:49) (17 - 18)  SpO2: 96% (17 Sep 2019 08:49) (92% - 97%)    PHYSICAL EXAM:    Constitutional: WDWN, lying comfortably in bed  HEENT: NC/AT, PERRL, anicteric sclera; oropharynx clear, MMM  Neck: supple, no appreciable JVD  Respiratory: End-expiratory wheezes in all lung fields.  Cardiovascular: +S1/S2, RRR  Gastrointestinal: soft, obese abdomen, midline scar from prior SBO surgery; NT/ND; +BSx4  Extremities: WWP; 2+ pitting edema in b/l lower extremities, L lower extremity warm to touch, erythematous on dorsal surface; R lower extremity nonerythematous, cooler to touch compared to L  Vascular: 1+ radial, DP pulses B/L  Neurological: AAOx3; no focal deficits    MEDICATIONS:  MEDICATIONS  (STANDING):  alteplase    Infusion . 1 mG/Hr (10 mL/Hr) IV Continuous <Continuous>  alteplase    Infusion . 1 mG/Hr (10 mL/Hr) IV Continuous <Continuous>  heparin  Infusion 2700 Unit(s)/Hr (5 mL/Hr) IV Continuous <Continuous>  influenza   Vaccine 0.5 milliLiter(s) IntraMuscular once    MEDICATIONS  (PRN):  calamine Lotion 1 Application(s) Topical three times a day PRN Rash and/or Itching      ALLERGIES:  Allergies    No Known Allergies    Intolerances        LABS:                        12.6   7.33  )-----------( 189      ( 17 Sep 2019 05:49 )             40.1     09-17    137  |  103  |  13  ----------------------------<  140<H>  3.8   |  24  |  0.93    Ca    8.7      17 Sep 2019 05:49  Phos  3.8     09-17  Mg     2.0     09-17    TPro  6.8  /  Alb  3.3  /  TBili  1.0  /  DBili  x   /  AST  14  /  ALT  13  /  AlkPhos  95  09-16    PT/INR - ( 16 Sep 2019 05:47 )   PT: 14.1 sec;   INR: 1.24          PTT - ( 17 Sep 2019 05:49 )  PTT:98.7 sec    CAPILLARY BLOOD GLUCOSE          RADIOLOGY & ADDITIONAL TESTS: Reviewed.

## 2019-09-17 NOTE — PROGRESS NOTE ADULT - ASSESSMENT
73yo F with PMH of HTN (not on medications) and uterine CA (s/p CHRISTIE, chemotherapy, and radiation, last chemo tx 3-4yrs ago) who was transferred to [facility] from [facility] after found to have submassive PE on imaging. Started on heparin gtt, and transferred to [facility] for medical management. Pt admitted to [facility] for telemetry monitoring and medical management of PE. Echo with LVEF 55% without R heart strain. L lower extremity with partially occlusive and occlusive thrombus involving L common femoral, femoral, and popliteal vein. Pt transferred to MICU for catheter-directed thrombectomy.        NEURO:  No active issues at this time.      PULM:  #Acute saddle pulmonary embolism without acute cor pulmonale  - Pt presenting from [facility] with CT finding of acute saddle PE w/ signs of R heart strain and early pulmonary infarct in the setting of one week of dyspnea on exertion and a sharp pain radiating from the back of the R shoulder through to the chest. D-dimer at [facility] 3129. Pt was started on heparin at an unclear rate and transferred to [facility] for further management. Pt w/ hx of uterine cancer, for which she underwent chemoradiation and a hysterectomy. On admission to [facility], pt with  Hx of malignancy, and Wells score 2.5. Pt denies recent surgery, trauma, kidney disease, liver disease, recent travel, prolonged immobility, history of PE/DVT, or use of medications that could increase risk of DVT/PE. Risk factors include obesity and history of malignancy.  -elevated BNP likely 2/2 R heart strain  - no repeat CTA PE protocol needed here  - b/l duplex both lower and upper extremities ordered  - L lower extremity +partially occlusive and occlusive thrombus involving L common femoral, femoral, and popliteal vein.  - on hep gtt, adjusted with q6h PTT and hep normogram   - trend PTT q4-6  - monitor closely for HD changes  - pulm and vascular consulted  - TTE performed 9/16; LVEF 55%, no R heart strain, normal RA and RV size, normal PASP  - pt transferred to MICU today for catheter-directed thrombectomy today AM  - continue supplemental O2 with NC to maintain O2 >94%.       CV:  # SIRS  - Pt presented to [facility] meeting SIRS criteria with T 101.3 WBC 12.44, , lactate 2.4. Unclear source of infection during initial workup. Pt was given tylenol, zithromax, rocephin, asa, 2.7L LR. Though pt met SIRS criteria, the fever, leukocytosis, tachycardia and lactate are most likely secondary to PE  - would not continue antibiotics at this time without clear infectious source  - consider sepsis workup if pt spikes or develops signs/symptoms of infection.     # Hypertension  - Pt with reported Hx of HTN, states she used to take medication, but stopped 2-3 years ago when she stopped seeing a doctor regularly.   - BP on arrival to [facility] 183/90.  - Monitor BP for now  - Would consider starting anti-hypertensive if HTN persists.      VASCULAR:  # Lower extremity edema  - Pt reports worsening lower extremity swelling of legs ever since radiation therapy for her uterine cancer, last treatment in 2015.  - L>R lower extremity erythema and warmth; both 2+ pitting  - possible DVT for cause of PE vs cellulitis  - L lower extremity +partially occlusive and occlusive thrombus involving L common femoral, femoral, and popliteal vein.       HEME/ONC:  # Malignant neoplasm of uterus  - Hx of uterine cancer, underwent chemoradiation and hysterectomy 3-4 years ago. Has not followed with an oncologist since.  - not currently an active issue.       F: Not indicated at this time  E: Replete PRN; K>4, Mg>2  N: NPO pending possible intervention    FULL CODE    DVT PPx: Heparin gtt  Dispo: MICU. 73yo F with PMH of HTN (not on medications) and uterine CA (s/p CHRISTIE, chemotherapy, and radiation, last chemo tx 3-4yrs ago) who was transferred to [facility] from [facility] after found to have submassive PE on imaging. Started on heparin gtt, and transferred to [facility] for medical management. Pt admitted to [facility] for telemetry monitoring and medical management of PE. Echo with LVEF 55% without R heart strain. L lower extremity with partially occlusive and occlusive thrombus involving L common femoral, femoral, and popliteal vein. Pt transferred to MICU for catheter-directed thrombectomy.        NEURO:  No active issues at this time.      PULM:  #Acute saddle pulmonary embolism without acute cor pulmonale  - Pt presenting from [facility] with CT finding of acute saddle PE w/ signs of R heart strain and early pulmonary infarct in the setting of one week of dyspnea on exertion and a sharp pain radiating from the back of the R shoulder through to the chest. D-dimer at [facility] 3129. Pt was started on heparin at an unclear rate and transferred to [facility] for further management. Pt w/ hx of uterine cancer, for which she underwent chemoradiation and a hysterectomy. On admission to [facility], pt with  Hx of malignancy, and Wells score 2.5. Pt denies recent surgery, trauma, kidney disease, liver disease, recent travel, prolonged immobility, history of PE/DVT, or use of medications that could increase risk of DVT/PE. Risk factors include obesity and history of malignancy.  -elevated BNP likely 2/2 R heart strain  - no repeat CTA PE protocol needed here  - TTE performed 9/16; LVEF 55%, no R heart strain, normal RA and RV size, normal PASP  - L lower extremity +partially occlusive and occlusive thrombus involving L common femoral, femoral, and popliteal vein  - S/p catheter-directed thrombectomy 9/17  - on tPA gtt, trending PT, PTT, fibrinogen, platelets q4hr   - PA pressures q8hr  - on hep gtt  - monitor closely for HD changes  - continue supplemental O2 with NC to maintain O2 >94%  - Eventually transition to anticoagulant      CV:  # SIRS  - Pt presented to [facility] meeting SIRS criteria with T 101.3 WBC 12.44, , lactate 2.4. Unclear source of infection during initial workup. Pt was given tylenol, zithromax, rocephin, asa, 2.7L LR. Though pt met SIRS criteria, the fever, leukocytosis, tachycardia and lactate are most likely secondary to PE  - would not continue antibiotics at this time without clear infectious source  - consider sepsis workup if pt spikes or develops signs/symptoms of infection.     # Hypertension  - Pt with reported Hx of HTN, states she used to take medication, but stopped 2-3 years ago when she stopped seeing a doctor regularly.   - BP on arrival to [facility] 183/90.  - Monitor BP for now  - Would consider starting anti-hypertensive if HTN persists.      VASCULAR:  # Lower extremity edema  - Pt reports worsening lower extremity swelling of legs ever since radiation therapy for her uterine cancer, last treatment in 2015.  - L>R lower extremity erythema and warmth; both 2+ pitting  - possible DVT for cause of PE vs cellulitis  - L lower extremity +partially occlusive and occlusive thrombus involving L common femoral, femoral, and popliteal vein.       HEME/ONC:  # Malignant neoplasm of uterus  - Hx of uterine cancer, underwent chemoradiation and hysterectomy 3-4 years ago. Has not followed with an oncologist since.  - not currently an active issue.       F: Not indicated at this time  E: Replete PRN; K>4, Mg>2  N: DASH/TLC diet  DVT PPx: Heparin gtt, tPA gtt  GI ppx: none  Code: FULL  Dispo: MICU

## 2019-09-17 NOTE — PROGRESS NOTE ADULT - SUBJECTIVE AND OBJECTIVE BOX
PULMONARY CONSULT FOLLOW-UP NOTE    INTERVAL HISTORY:   Reviewed chart and overnight events; patient seen and examined at bedside.  No overnight events  On 2L NC, satting 95%.   NO Chest pain, SOB at rest.  left shoulder pain not present.     MEDICATIONS:  Pulmonary:    Antimicrobials:    Anticoagulants:  alteplase    Infusion . 1 mG/Hr IV Continuous <Continuous>  alteplase    Infusion . 1 mG/Hr IV Continuous <Continuous>  heparin  Infusion 2700 Unit(s)/Hr IV Continuous <Continuous>    Cardiac:      Allergies    No Known Allergies    Intolerances        Vital Signs Last 24 Hrs  T(C): 36.7 (17 Sep 2019 10:08), Max: 36.7 (17 Sep 2019 10:08)  T(F): 98.1 (17 Sep 2019 10:08), Max: 98.1 (17 Sep 2019 10:08)  HR: 86 (17 Sep 2019 08:49) (76 - 86)  BP: 162/91 (17 Sep 2019 08:49) (140/83 - 170/99)  BP(mean): 121 (17 Sep 2019 08:49) (95 - 128)  RR: 17 (17 Sep 2019 08:49) (17 - 18)  SpO2: 96% (17 Sep 2019 08:49) (92% - 97%)    09-16 @ 07:01  -  09-17 @ 07:00  --------------------------------------------------------  IN: 294 mL / OUT: 0 mL / NET: 294 mL          PHYSICAL EXAM:  Constitutional: oriented, not in distress, obese.   HEENT: NC/AT; PERRL, anicteric sclera; MMM  Neck: supple  Cardiovascular: +S1/S2, RRR  Respiratory: CTA B/L; no W/R/R  Gastrointestinal: soft, NT/ND; +BSx4  Extremities: WWP; no edema, clubbing or cyanosis  Vascular: 2+ radial, DP/PT pulses B/L  Neurological: AAOx3; no focal deficits    LABS:      CBC Full  -  ( 17 Sep 2019 05:49 )  WBC Count : 7.33 K/uL  RBC Count : 4.55 M/uL  Hemoglobin : 12.6 g/dL  Hematocrit : 40.1 %  Platelet Count - Automated : 189 K/uL  Mean Cell Volume : 88.1 fl  Mean Cell Hemoglobin : 27.7 pg  Mean Cell Hemoglobin Concentration : 31.4 gm/dL  Auto Neutrophil # : x  Auto Lymphocyte # : x  Auto Monocyte # : x  Auto Eosinophil # : x  Auto Basophil # : x  Auto Neutrophil % : x  Auto Lymphocyte % : x  Auto Monocyte % : x  Auto Eosinophil % : x  Auto Basophil % : x    09-17    137  |  103  |  13  ----------------------------<  140<H>  3.8   |  24  |  0.93    Ca    8.7      17 Sep 2019 05:49  Phos  3.8     09-17  Mg     2.0     09-17    TPro  6.8  /  Alb  3.3  /  TBili  1.0  /  DBili  x   /  AST  14  /  ALT  13  /  AlkPhos  95  09-16    PT/INR - ( 16 Sep 2019 05:47 )   PT: 14.1 sec;   INR: 1.24          PTT - ( 17 Sep 2019 05:49 )  PTT:98.7 sec    RADIOLOGY & ADDITIONAL STUDIES:  reviewed.     POCUS:  N/A

## 2019-09-17 NOTE — PROGRESS NOTE ADULT - ASSESSMENT
This is a 73yo F with medical hx significant for HTN, Uterine CA s/p CHRISTIE, chemotherapy, and radiation x 4 yrs ago, presented to OSH with SOB , found to have Acute submassive PE, transferred to [facility] for further management on 09/16.   Echocardiogram showing no right heart failure   Duplex LE showing left common femoral DVT   Patient had episode of desaturation when walking with Sat's in the low 80's per pulmonology     Plan:  - To the OR today for thrombolysis

## 2019-09-17 NOTE — PROGRESS NOTE ADULT - PROBLEM SELECTOR PLAN 1
Acute saddle pulmonary embolism without acute cor pulmonale. Recommendation: Submassive PE, Bilateral / Saddle, Hemodynamically stable.   Etiology: HX of malignancy with no follow up for last 4 years. Obese but independent in ADL's, former smoker, post menopausal, No recent surgery, travel.  CE negative, BNP elevated, EKG : RBBB  ECHO shows no RH dilation, PASP normal.   DVT left Lower extremity ( CFV, Femoral, Popliteal)   - C/w heparin drip.   - Hypoxic to 86% on exertion.  - Vascular will likely do catheter directed thrombectomy.   - high suspicion that etiology of VTE might be related to her hx of cancer perhaps recurrence now. Would need a follow up with heme onc.   - Lovenox outpatient given malignancy is the likely cause here.   - continue supplemental O2 with NC to maintain O2 >94%

## 2019-09-17 NOTE — PROGRESS NOTE ADULT - ASSESSMENT
This is a 73yo F with medical hx significant for HTN, Uterine CA s/p CHRISTIE, chemotherapy, and radiation x 4 yrs ago, presented to OSH with SOB , found to have Acute submassive PE, transferred to [facility] for further management on 09/16.

## 2019-09-17 NOTE — PROCEDURE NOTE - NSPROCDETAILS_GEN_ALL_CORE
location identified, draped/prepped, sterile technique used, needle inserted/introduced/positive blood return obtained via catheter/sutured in place/connected to a pressurized flush line

## 2019-09-17 NOTE — PROGRESS NOTE ADULT - ASSESSMENT
75yo F with PMH of HTN (not on medications) and uterine CA (s/p CHRISTIE, chemotherapy, and radiation, last chemo tx 3-4yrs ago) who was transferred to [facility] from [facility] after found to have submassive PE on imaging. Started on heparin gtt, and transferred to [facility] for medical management. Pt admitted to [facility] for telemetry monitoring and medical management of PE. Echo with LVEF 55% without R heart strain. L lower extremity with partially occlusive and occlusive thrombus involving L common femoral, femoral, and popliteal vein. Pt transferred to MICU for catheter-directed thrombectomy.

## 2019-09-17 NOTE — PROGRESS NOTE ADULT - SUBJECTIVE AND OBJECTIVE BOX
SUBJECTIVE:   Patient doing well when resting   She had episode of desaturation when walking     MEDICATIONS  (STANDING):  alteplase    Infusion . 1 mG/Hr (10 mL/Hr) IV Continuous <Continuous>  alteplase    Infusion . 1 mG/Hr (10 mL/Hr) IV Continuous <Continuous>  alteplase    Infusion . 0.5 mG/Hr (5 mL/Hr) IV Continuous <Continuous>  alteplase    Infusion . 0.5 mG/Hr (5 mL/Hr) IV Continuous <Continuous>  heparin  Infusion 500 Unit(s)/Hr (5 mL/Hr) IV Continuous <Continuous>  influenza   Vaccine 0.5 milliLiter(s) IntraMuscular once    MEDICATIONS  (PRN):  calamine Lotion 1 Application(s) Topical three times a day PRN Rash and/or Itching      Vital Signs Last 24 Hrs  T(C): 36.1 (17 Sep 2019 14:41), Max: 36.7 (17 Sep 2019 10:08)  T(F): 97 (17 Sep 2019 14:41), Max: 98.1 (17 Sep 2019 10:08)  HR: 74 (17 Sep 2019 15:00) (68 - 86)  BP: 154/58 (17 Sep 2019 12:41) (140/75 - 170/99)  BP(mean): 100 (17 Sep 2019 12:41) (94 - 128)  RR: 23 (17 Sep 2019 15:00) (17 - 29)  SpO2: 96% (17 Sep 2019 15:00) (92% - 98%)  I&O's Detail    16 Sep 2019 07:01  -  17 Sep 2019 07:00  --------------------------------------------------------  IN:    heparin  Infusion.: 240 mL    heparin Infusion: 54 mL  Total IN: 294 mL    OUT:  Total OUT: 0 mL    Total NET: 294 mL      17 Sep 2019 07:01  -  17 Sep 2019 15:56  --------------------------------------------------------  IN:    alteplase    Infusion.: 40 mL    alteplase    Infusion.: 40 mL    heparin Infusion: 5 mL    heparin Infusion: 15 mL  Total IN: 100 mL    OUT:  Total OUT: 0 mL    Total NET: 100 mL      PHYSICAL EXAM:   Neuro: awake and alert, no focal deficit   HEENT: normocephalic, no scleral ictera   Pulm: non labored breathing on 2L NC, lungs are clear to auscultation   CV: regular rate and rhythm, no murmur to ausculation, no carotid bruit   GI: abdomen is soft non tender to palaption, no hepatomegaly    MSK: Bilateral lower extremity swelling   Skin: no rash, no wound, no phlegmasia    Psych: cooperative, appropriate mood and affect       LABS:                        12.6   7.33  )-----------( 189      ( 17 Sep 2019 05:49 )             40.1     09-17    137  |  103  |  13  ----------------------------<  140<H>  3.8   |  24  |  0.93    Ca    8.7      17 Sep 2019 05:49  Phos  3.8     09-17  Mg     2.0     09-17    TPro  6.8  /  Alb  3.3  /  TBili  1.0  /  DBili  x   /  AST  14  /  ALT  13  /  AlkPhos  95  09-16    PT/INR - ( 17 Sep 2019 13:09 )   PT: 13.4 sec;   INR: 1.18          PTT - ( 17 Sep 2019 13:09 )  PTT:40.8 sec      RADIOLOGY & ADDITIONAL STUDIES:

## 2019-09-17 NOTE — BRIEF OPERATIVE NOTE - OPERATION/FINDINGS
Pulmonary angiogram performed for submassive PE. Catheter-directed TPA thrombolysis through bilateral groins. 2mg bolus given through left sheath and 2mg bolus given through right sheath. Catheters sutured in place, with infusion of 1mg/hr of Tpa running bilaterally.

## 2019-09-17 NOTE — PROGRESS NOTE ADULT - SUBJECTIVE AND OBJECTIVE BOX
[facility] TO MICU TRANSFER NOTE    HOSPITAL COURSE:  Mrs. Escobar is a 73yo F with PMH of HTN (not on medications) and uterine CA (s/p CHRISTIE, chemotherapy, and radiation, last chemo tx 3-4yrs ago), and surgical hx of cholecystectomy, SBOx2, and CHRISTIE), who was transferred to [facility] from [facility] after found to have submassive PE on imaging. Pt initially presented to [facility] overnight c/o 1 wk of SOB and fatigue, and stabbing back pain x1 day. At [facility], T101.3 rectal, , /90, RR35, O2sat 88% on RA. Placed on 2L NC with O2sat improving to 95%. Labs significant for WBC12.44 (w/ neutrophilic predominance), Hb/Hct 14.9/46.7, BUN/Cr 20/1.26. D-dimer 3129, Trop I 0.031, lactate 2.4. ABG pH 7.44, pCO2 30, pO2 77, HCO3 19.6. EKG w/ sinus tachycardia and RBBB. CTA w/ large saddle embolus, signs of R heart strain, and signs of early pulmonary infarction. For suspected sepsis, given ceftriaxone 2g, azithromycin 500mg, and 2.7L lactated ringers. Also given ASA 162mg, and tylenol 650mg.  She was started on heparin prior to transfer to [facility]. Upon arrival to [facility], patient has been hemodynamically stable, sating well 92-98 on 2L NC, HR 70s-80s, BPs 140s-170s. Hep gtt rate adjusted here per q6h PTT. TTE showed LVEF 55% without dilatation of R heart. Lower extremity doppler significant for L lower extremity partially occlusive and occlusive thrombus involving L common femoral, femoral and popliteal veins. Patient was transferred to MICU on 9/17 for catheter-directed lysis of saddle embolus.      OVERNIGHT EVENTS:    SUBJECTIVE / INTERVAL HPI: Patient seen and examined at bedside. Denies f/c, n/v, HA, chest pain, SOB, abdominal pain, diarrhea, constipation, melena, hematochezia, hematuria, dysuria    MEDICATIONS  (STANDING):  alteplase    Infusion . 1 mG/Hr (10 mL/Hr) IV Continuous <Continuous>  alteplase    Infusion . 1 mG/Hr (10 mL/Hr) IV Continuous <Continuous>  heparin  Infusion 2700 Unit(s)/Hr (5 mL/Hr) IV Continuous <Continuous>  influenza   Vaccine 0.5 milliLiter(s) IntraMuscular once    MEDICATIONS  (PRN):  calamine Lotion 1 Application(s) Topical three times a day PRN Rash and/or Itching    Allergies    No Known Allergies    Intolerances        VITAL SIGNS:  Vital Signs Last 24 Hrs  T(C): 36 (17 Sep 2019 12:22), Max: 36.7 (17 Sep 2019 10:08)  T(F): 96.8 (17 Sep 2019 12:22), Max: 98.1 (17 Sep 2019 10:08)  HR: 76 (17 Sep 2019 13:00) (74 - 86)  BP: 154/58 (17 Sep 2019 12:41) (140/75 - 170/99)  BP(mean): 100 (17 Sep 2019 12:41) (94 - 128)  RR: 28 (17 Sep 2019 13:00) (17 - 28)  SpO2: 98% (17 Sep 2019 13:00) (92% - 98%)      09-16-19 @ 07:01  -  09-17-19 @ 07:00  --------------------------------------------------------  IN: 294 mL / OUT: 0 mL / NET: 294 mL    09-17-19 @ 07:01  - 09-17-19 @ 13:17  --------------------------------------------------------  IN: 50 mL / OUT: 0 mL / NET: 50 mL        PHYSICAL EXAM:  General: NAD, Laying comfortably in bed  HEENT: NC/AT, anicteric sclera, MMM  Neck: supple  Cardiovascular: +S1/S2, RRR, No murmurs, rubs, gallops  Respiratory: CTA B/L, no W/R/R  Gastrointestinal: soft, NT/ND, +BSx4  Extremities: WWP, no edema, clubbing or cyanosis  Vascular: 2+ radial, DP/PT pulses B/L  Neurological: AAOx3, no focal deficits      LABS:                        12.6   7.33  )-----------( 189      ( 17 Sep 2019 05:49 )             40.1     09-17    137  |  103  |  13  ----------------------------<  140<H>  3.8   |  24  |  0.93    Ca    8.7      17 Sep 2019 05:49  Phos  3.8     09-17  Mg     2.0     09-17    TPro  6.8  /  Alb  3.3  /  TBili  1.0  /  DBili  x   /  AST  14  /  ALT  13  /  AlkPhos  95  09-16    PT/INR - ( 16 Sep 2019 05:47 )   PT: 14.1 sec;   INR: 1.24          PTT - ( 17 Sep 2019 05:49 )  PTT:98.7 sec    CAPILLARY BLOOD GLUCOSE              RADIOLOGY & ADDITIONAL TESTS: Reviewed. [facility] TO MICU TRANSFER NOTE    HOSPITAL COURSE:  Mrs. Escobar is a 75yo F with PMH of HTN (not on medications) and uterine CA (s/p CHRISTIE, chemotherapy, and radiation, last chemo tx 3-4yrs ago), and surgical hx of cholecystectomy, SBOx2, and CHRISTIE), who was transferred to [facility] from [facility] after found to have submassive PE on imaging. Pt initially presented to [facility] overnight c/o 1 wk of SOB and fatigue, and stabbing back pain x1 day. At [facility], T101.3 rectal, , /90, RR35, O2sat 88% on RA. Placed on 2L NC with O2sat improving to 95%. Labs significant for WBC12.44 (w/ neutrophilic predominance), Hb/Hct 14.9/46.7, BUN/Cr 20/1.26. D-dimer 3129, Trop I 0.031, lactate 2.4. ABG pH 7.44, pCO2 30, pO2 77, HCO3 19.6. EKG w/ sinus tachycardia and RBBB. CTA w/ large saddle embolus, signs of R heart strain, and signs of early pulmonary infarction. For suspected sepsis, given ceftriaxone 2g, azithromycin 500mg, and 2.7L lactated ringers. Also given ASA 162mg, and tylenol 650mg.  She was started on heparin prior to transfer to [facility]. Upon arrival to [facility], patient has been hemodynamically stable, sating well 92-98 on 2L NC, HR 70s-80s, BPs 140s-170s. Hep gtt rate adjusted here per q6h PTT. TTE showed LVEF 55% without dilatation of R heart. Lower extremity doppler significant for L lower extremity partially occlusive and occlusive thrombus involving L common femoral, femoral and popliteal veins. Patient was transferred to MICU on 9/17 for catheter-directed lysis of saddle embolus.      OVERNIGHT EVENTS: NAEO.    SUBJECTIVE / INTERVAL HPI: Patient seen and examined at bedside. Pt reports improvement in breathing. Denies f/c, n/v, HA, chest pain, SOB, abdominal pain.    MEDICATIONS  (STANDING):  alteplase    Infusion . 1 mG/Hr (10 mL/Hr) IV Continuous <Continuous>  alteplase    Infusion . 1 mG/Hr (10 mL/Hr) IV Continuous <Continuous>  heparin  Infusion 2700 Unit(s)/Hr (5 mL/Hr) IV Continuous <Continuous>  influenza   Vaccine 0.5 milliLiter(s) IntraMuscular once    MEDICATIONS  (PRN):  calamine Lotion 1 Application(s) Topical three times a day PRN Rash and/or Itching    Allergies    No Known Allergies    Intolerances        VITAL SIGNS:  ICU Vital Signs Last 24 Hrs  T(C): 36.9 (17 Sep 2019 18:08), Max: 36.9 (17 Sep 2019 18:08)  T(F): 98.4 (17 Sep 2019 18:08), Max: 98.4 (17 Sep 2019 18:08)  HR: 76 (17 Sep 2019 21:00) (68 - 86)  BP: 154/58 (17 Sep 2019 12:41) (140/75 - 162/91)  BP(mean): 100 (17 Sep 2019 12:41) (94 - 121)  ABP: 146/64 (17 Sep 2019 21:00) (140/66 - 166/92)  ABP(mean): 94 (17 Sep 2019 21:00) (94 - 122)  RR: 30 (17 Sep 2019 21:00) (17 - 30)  SpO2: 98% (17 Sep 2019 21:00) (95% - 98%)        09-16-19 @ 07:01  -  09-17-19 @ 07:00  --------------------------------------------------------  IN: 294 mL / OUT: 0 mL / NET: 294 mL    09-17-19 @ 07:01  -  09-17-19 @ 13:17  --------------------------------------------------------  IN: 50 mL / OUT: 0 mL / NET: 50 mL        PHYSICAL EXAM:  General: Older woman, morbidly obese, NAD, Laying comfortably in bed  HEENT: NC/AT, anicteric sclera, MMM  Neck: supple  Cardiovascular: +S1/S2, RRR, No murmurs, rubs, gallops  Respiratory: CTA B/L, no W/R/R  Gastrointestinal: Obese, soft, NT/ND, +BSx4  Extremities: WWP, 1+ pitting edema & mildly TTP b/l, no clubbing or cyanosis, b/l femoral lines in place  Vascular: 2+ radial, DP/PT pulses B/L  Neurological: AAOx3, no focal deficits      LABS:                        12.6   7.33  )-----------( 189      ( 17 Sep 2019 05:49 )             40.1     09-17    137  |  103  |  13  ----------------------------<  140<H>  3.8   |  24  |  0.93    Ca    8.7      17 Sep 2019 05:49  Phos  3.8     09-17  Mg     2.0     09-17    TPro  6.8  /  Alb  3.3  /  TBili  1.0  /  DBili  x   /  AST  14  /  ALT  13  /  AlkPhos  95  09-16    PT/INR - ( 16 Sep 2019 05:47 )   PT: 14.1 sec;   INR: 1.24     PTT - ( 17 Sep 2019 05:49 )  PTT:98.7 sec        RADIOLOGY & ADDITIONAL TESTS: Reviewed.    < from: US Duplex Venous Lower Ext Complete, Bilateral (09.16.19 @ 19:36) >  IMPRESSION:  Partially occlusive and occlusive thrombosis within the left common   femoral, femoral, and popliteal veins. Limited visualization of bilateral   distal femoral, posterior tibial, and peroneal veins due to patient's   body habitus.  < end of copied text >

## 2019-09-17 NOTE — PROGRESS NOTE ADULT - PROBLEM SELECTOR PLAN 1
Pt presenting from [facility] with CT finding of acute saddle PE w/ signs of R heart strain and early pulmonary infarct in the setting of one week of dyspnea on exertion and a sharp pain radiating from the back of the R shoulder through to the chest. D-dimer at [facility] 3129. Pt was started on heparin at an unclear rate and transferred to [facility] for further management. Pt w/ hx of uterine cancer, for which she underwent chemoradiation and a hysterectomy. On admission to [facility], pt with  Hx of malignancy, and Wells score 2.5. Pt denies recent surgery, trauma, kidney disease, liver disease, recent travel, prolonged immobility, history of PE/DVT, or use of medications that could increase risk of DVT/PE. Risk factors include obesity and history of malignancy.  -elevated BNP likely 2/2 R heart strain  - no repeat CTA PE protocol needed here  - b/l duplex both lower and upper extremities ordered  - L lower extremity +partially occlusive and occlusive thrombus involving L common femoral, femoral, and popliteal vein.  - on hep gtt, adjusted with q6h PTT and hep normogram   - trend PTT q4-6  - monitor closely for HD changes  - pulm and vascular consulted  - TTE performed 9/16; LVEF 55%, no R heart strain, normal RA and RV size, normal PASP  - pt transferred to MICU today for catheter-directed thrombectomy today AM  - continue supplemental O2 with NC to maintain O2 >94%

## 2019-09-18 DIAGNOSIS — Z91.89 OTHER SPECIFIED PERSONAL RISK FACTORS, NOT ELSEWHERE CLASSIFIED: ICD-10-CM

## 2019-09-18 LAB
ANION GAP SERPL CALC-SCNC: 11 MMOL/L — SIGNIFICANT CHANGE UP (ref 5–17)
APTT BLD: 30.3 SEC — SIGNIFICANT CHANGE UP (ref 27.5–36.3)
APTT BLD: 31.8 SEC — SIGNIFICANT CHANGE UP (ref 27.5–36.3)
APTT BLD: 31.8 SEC — SIGNIFICANT CHANGE UP (ref 27.5–36.3)
APTT BLD: 33.3 SEC — SIGNIFICANT CHANGE UP (ref 27.5–36.3)
BUN SERPL-MCNC: 13 MG/DL — SIGNIFICANT CHANGE UP (ref 7–23)
CALCIUM SERPL-MCNC: 8.4 MG/DL — SIGNIFICANT CHANGE UP (ref 8.4–10.5)
CHLORIDE SERPL-SCNC: 105 MMOL/L — SIGNIFICANT CHANGE UP (ref 96–108)
CO2 SERPL-SCNC: 24 MMOL/L — SIGNIFICANT CHANGE UP (ref 22–31)
CREAT SERPL-MCNC: 0.87 MG/DL — SIGNIFICANT CHANGE UP (ref 0.5–1.3)
FIBRINOGEN PPP-MCNC: 231 MG/DL — LOW (ref 258–438)
FIBRINOGEN PPP-MCNC: 237 MG/DL — LOW (ref 258–438)
FIBRINOGEN PPP-MCNC: 302 MG/DL — SIGNIFICANT CHANGE UP (ref 258–438)
GLUCOSE SERPL-MCNC: 119 MG/DL — HIGH (ref 70–99)
HCT VFR BLD CALC: 39 % — SIGNIFICANT CHANGE UP (ref 34.5–45)
HCT VFR BLD CALC: 39.2 % — SIGNIFICANT CHANGE UP (ref 34.5–45)
HGB BLD-MCNC: 12.2 G/DL — SIGNIFICANT CHANGE UP (ref 11.5–15.5)
HGB BLD-MCNC: 12.3 G/DL — SIGNIFICANT CHANGE UP (ref 11.5–15.5)
INR BLD: 1.16 — SIGNIFICANT CHANGE UP (ref 0.88–1.16)
INR BLD: 1.2 — HIGH (ref 0.88–1.16)
INR BLD: 1.22 — HIGH (ref 0.88–1.16)
INR BLD: 1.25 — HIGH (ref 0.88–1.16)
MAGNESIUM SERPL-MCNC: 1.9 MG/DL — SIGNIFICANT CHANGE UP (ref 1.6–2.6)
MCHC RBC-ENTMCNC: 27.3 PG — SIGNIFICANT CHANGE UP (ref 27–34)
MCHC RBC-ENTMCNC: 27.4 PG — SIGNIFICANT CHANGE UP (ref 27–34)
MCHC RBC-ENTMCNC: 31.3 GM/DL — LOW (ref 32–36)
MCHC RBC-ENTMCNC: 31.4 GM/DL — LOW (ref 32–36)
MCV RBC AUTO: 87.1 FL — SIGNIFICANT CHANGE UP (ref 80–100)
MCV RBC AUTO: 87.4 FL — SIGNIFICANT CHANGE UP (ref 80–100)
NRBC # BLD: 0 /100 WBCS — SIGNIFICANT CHANGE UP (ref 0–0)
NRBC # BLD: 0 /100 WBCS — SIGNIFICANT CHANGE UP (ref 0–0)
PHOSPHATE SERPL-MCNC: 3.7 MG/DL — SIGNIFICANT CHANGE UP (ref 2.5–4.5)
PLATELET # BLD AUTO: 153 K/UL — SIGNIFICANT CHANGE UP (ref 150–400)
PLATELET # BLD AUTO: 157 K/UL — SIGNIFICANT CHANGE UP (ref 150–400)
POTASSIUM SERPL-MCNC: 4.1 MMOL/L — SIGNIFICANT CHANGE UP (ref 3.5–5.3)
POTASSIUM SERPL-SCNC: 4.1 MMOL/L — SIGNIFICANT CHANGE UP (ref 3.5–5.3)
PROTHROM AB SERPL-ACNC: 13.2 SEC — HIGH (ref 10–12.9)
PROTHROM AB SERPL-ACNC: 13.6 SEC — HIGH (ref 10–12.9)
PROTHROM AB SERPL-ACNC: 13.9 SEC — HIGH (ref 10–12.9)
PROTHROM AB SERPL-ACNC: 14.2 SEC — HIGH (ref 10–12.9)
RBC # BLD: 4.46 M/UL — SIGNIFICANT CHANGE UP (ref 3.8–5.2)
RBC # BLD: 4.5 M/UL — SIGNIFICANT CHANGE UP (ref 3.8–5.2)
RBC # FLD: 14.5 % — SIGNIFICANT CHANGE UP (ref 10.3–14.5)
RBC # FLD: 14.5 % — SIGNIFICANT CHANGE UP (ref 10.3–14.5)
SODIUM SERPL-SCNC: 140 MMOL/L — SIGNIFICANT CHANGE UP (ref 135–145)
WBC # BLD: 6.69 K/UL — SIGNIFICANT CHANGE UP (ref 3.8–10.5)
WBC # BLD: 7.9 K/UL — SIGNIFICANT CHANGE UP (ref 3.8–10.5)
WBC # FLD AUTO: 6.69 K/UL — SIGNIFICANT CHANGE UP (ref 3.8–10.5)
WBC # FLD AUTO: 7.9 K/UL — SIGNIFICANT CHANGE UP (ref 3.8–10.5)

## 2019-09-18 PROCEDURE — 99232 SBSQ HOSP IP/OBS MODERATE 35: CPT | Mod: GC

## 2019-09-18 PROCEDURE — 99233 SBSQ HOSP IP/OBS HIGH 50: CPT | Mod: GC

## 2019-09-18 PROCEDURE — 71045 X-RAY EXAM CHEST 1 VIEW: CPT | Mod: 26

## 2019-09-18 RX ORDER — LISINOPRIL 2.5 MG/1
5 TABLET ORAL DAILY
Refills: 0 | Status: DISCONTINUED | OUTPATIENT
Start: 2019-09-18 | End: 2019-09-19

## 2019-09-18 RX ORDER — HEPARIN SODIUM 5000 [USP'U]/ML
2400 INJECTION INTRAVENOUS; SUBCUTANEOUS
Qty: 25000 | Refills: 0 | Status: DISCONTINUED | OUTPATIENT
Start: 2019-09-18 | End: 2019-09-19

## 2019-09-18 RX ORDER — ACETAMINOPHEN 500 MG
650 TABLET ORAL ONCE
Refills: 0 | Status: COMPLETED | OUTPATIENT
Start: 2019-09-18 | End: 2019-09-18

## 2019-09-18 RX ORDER — ALTEPLASE 100 MG
0.25 KIT INTRAVENOUS
Qty: 10 | Refills: 0 | Status: DISCONTINUED | OUTPATIENT
Start: 2019-09-18 | End: 2019-09-18

## 2019-09-18 RX ORDER — HEPARIN SODIUM 5000 [USP'U]/ML
500 INJECTION INTRAVENOUS; SUBCUTANEOUS
Qty: 25000 | Refills: 0 | Status: DISCONTINUED | OUTPATIENT
Start: 2019-09-18 | End: 2019-09-18

## 2019-09-18 RX ADMIN — HEPARIN SODIUM 5 UNIT(S)/HR: 5000 INJECTION INTRAVENOUS; SUBCUTANEOUS at 08:50

## 2019-09-18 RX ADMIN — ALTEPLASE 2.5 MG/HR: KIT at 08:58

## 2019-09-18 RX ADMIN — LISINOPRIL 5 MILLIGRAM(S): 2.5 TABLET ORAL at 22:39

## 2019-09-18 RX ADMIN — Medication 650 MILLIGRAM(S): at 07:15

## 2019-09-18 RX ADMIN — HEPARIN SODIUM 24 UNIT(S)/HR: 5000 INJECTION INTRAVENOUS; SUBCUTANEOUS at 19:45

## 2019-09-18 RX ADMIN — Medication 650 MILLIGRAM(S): at 06:23

## 2019-09-18 NOTE — PROGRESS NOTE ADULT - ASSESSMENT
75yo F with PMH of HTN (not on medications) and uterine CA (s/p CHRISTIE, chemotherapy, and radiation, last chemo tx 3-4yrs ago) who was transferred to [facility] from [facility] after found to have submassive PE on imaging. Started on heparin gtt, and transferred to [facility] for medical management. Pt admitted to [facility] for telemetry monitoring and medical management of PE. Echo with LVEF 55% without R heart strain. L lower extremity with partially occlusive and occlusive thrombus involving L common femoral, femoral, and popliteal vein. Pt transferred to MICU for catheter-directed thrombectomy. Now status post thrombectomy and stable for transfer to [facility].

## 2019-09-18 NOTE — DIETITIAN INITIAL EVALUATION ADULT. - NAME AND PHONE
Samantha Gitlin, RD, CDN, [facility], [phone] or available on [facility]

## 2019-09-18 NOTE — PROGRESS NOTE ADULT - PROBLEM SELECTOR PLAN 1
Submassive PE, Bilateral / Saddle, Hemodynamically stable.   Etiology: HX of malignancy with no follow up for last 4 years. Obese but independent in ADL's, former smoker, post menopausal, No recent surgery, travel.  CE negative, BNP elevated, EKG : RBBB. ECHO shows no RH dilation, PASP normal.   DVT left Lower extremity ( CFV, Femoral, Popliteal)  S/p Catheter directed lysis with TPA 09/17  - PA pressure in range of 40-46/ 22-29 with not much improvement since yesterday.   - C/w TPA , up titrate for now after speaking with vascular. Stop at 130 pm.   - On 2 L NC this AM.   - high suspicion that etiology of VTE might be related to her hx of cancer perhaps recurrence now. Would need a follow up with heme onc.   - Lovenox outpatient given malignancy is the likely cause here.   - continue supplemental O2 with NC to maintain O2 >94%

## 2019-09-18 NOTE — PROGRESS NOTE ADULT - PROBLEM SELECTOR PLAN 1
CT at [facility] demonstrating acute saddle pulmonary embolism with signs of right heart strain and early pulmonary infarct. TTE on 9/15 EF 55%, no right heart strain, normal RA and RV, normal PASP. Found to have L lower extremity partially occlusive and occlusive thrombus of the L common femoral, femoral, and popliteal vein. Now status post catheter directed thrombectomy on 9/17.    -tPA stopped at 1:30PM on 9/18 - monitoring PT, PTT, fibrinogen, platelets Q4H.  -PA pressures Q8H  -Monitor for HD instability  -Supplemental O2 as needed  -Eventual transition to oral anticoagulant  -Follow up pulerica barr

## 2019-09-18 NOTE — DIETITIAN INITIAL EVALUATION ADULT. - ENERGY NEEDS
Height 66"; .8#; #; 224%IBW  BMI 47.1  Ideal body weight used for calculations as pt >120% of IBW. Needs estimated for maintenance in older adults

## 2019-09-18 NOTE — CHART NOTE - NSCHARTNOTEFT_GEN_A_CORE
Upon Nutritional Assessment by the Registered Dietitian your patient was determined to meet criteria / has evidence of the following diagnosis/diagnoses:          [ ]  Mild Protein Calorie Malnutrition        [ ]  Moderate Protein Calorie Malnutrition        [ ] Severe Protein Calorie Malnutrition        [ ] Unspecified Protein Calorie Malnutrition        [ ] Underweight / BMI <19        [X ] Morbid Obesity / BMI > 40      Findings as based on:  •  Comprehensive nutrition assessment and consultation    BMI 47.1    Treatment:    The following diet has been recommended:    Encourage diet compliance  Encourage safe 1-2# weight loss per week through healthy PO intake and physical activity once appropriate     PROVIDER Section:     By signing this assessment you are acknowledging and agree with the diagnosis/diagnoses assigned by the Registered Dietitian    Comments:

## 2019-09-18 NOTE — DIETITIAN INITIAL EVALUATION ADULT. - OTHER INFO
75 yo/female with PMHx HTN, uterine cancer s/p CHRISTIE, cholecystectomy, SBO, presented to OSH with SOB/back pain, found to have submassive PE likely 2/2 DVT in the setting of LLE pain and swelling. Transferred to [facility] after being started on heparin drip. CTA showing large saddle embolus with R. heart strain; transferred to MICU for catheter-directed lysis. Duplex LE showing left common femoral DVT. Pt seen in room, awake, alert, very pleasant, breathing on NC. She endorses having a good appetite at home, likes to eat fish, fruits, vegetables. >50% intake at breakfast endorsed. No N/V/C/D reported at this time. NKFA. Denies difficulty chewing or swallowing. Skin intact pressure-wise; noted w/rash. Alteplase infusing. DASH/TLC diet handout provided though unable to fully verbally review and pt received a phone call. Will continue to follow per RD protocol.

## 2019-09-18 NOTE — PROGRESS NOTE ADULT - ASSESSMENT
This is a 75yo F with medical hx significant for HTN, Uterine CA s/p CHRISTIE, chemotherapy, and radiation x 4 yrs ago, presented to OSH with SOB , found to have Acute submassive PE, transferred to [facility] for further management on 09/16.   Echocardiogram showing no right heart failure   Duplex LE showing left common femoral DVT   Now s/p catheter directed thrombolysis on 9/17/19    Plan:  - will stop tPa at 13:30  - Will pull catheter and sheath   - Can resume heparin 1hrs after sheath pulled

## 2019-09-18 NOTE — PROGRESS NOTE ADULT - ASSESSMENT
Subjective:       Patient ID: Lima Maldonado is a 69 y.o. female.    Chief Complaint: Breast Cancer    Ms Maldonado return to clinic for follow-up of  diagnosis of left breast cancer. She had stage I ER positive disease with an intermediate risk Oncotype score.   She completed 4 cycles of TC on 3/31/17.    She underwent fat transfer at breast in August, Dr. Krunal Arboleda.   Patient feeling well other than recent URI and cough which she is recovering from. No fever, chills, nausea, vomiting or shortness of breath. She is tolerating letrozole well. She has some mild arthritic complaints but these do not effect ADLs. No hot flashes.  She does have some peripheral neuropathy, manageable with comfortable shoes.   Patient remains on Prolia under care of Dr. Horta for history of osteoporosis.           Breast history: mammography on September 20, 2016 demonstrated a new irregular mass with spiculated margins seen in the left breast at  5 o'clock along the surgical scar site.ultrasound DEMONSTRATED A SOLID 8 X 8 X 7 MM MASS.    A needle biopsy in September 27 showed infiltrating carcinoma, histologic grade 3, nuclear grade 2, mitotic index 1. Tumor was 90% ER positive, 70% PA positive, and 1+ HER-2.     MRI of the breast showed a 1.7 x 1.3 cm lesion in the lower outer quadrant of the left breast.  PET/CT on October 7 was negative for any evidence of distant metastasis.    On November 16 bilateral mastectomies were performed. Left pressure 1.5 cm intermediate grade carcinoma with ductal and lobular features. There was focal dermal invasion. Margins were negative. The right breast was without abnormality.  Oncotype score returned 25 - intermediate risk.  Adjuvant TC X 4 completed 3/31/17.    Patient with history of left breast cancer in 1997 when she underwent left lumpectomy with complete axillary dissection at age 48 for a pT1cN1 breast cancer (Stage IIA).  47 nodes were removed, 1 of which was positive.  She received  75yo F with PMH of HTN (not on medications) and uterine CA (s/p CHRISTIE, chemotherapy, and radiation, last chemo tx 3-4yrs ago) who was transferred to [facility] from [facility] after found to have submassive PE on imaging. Started on heparin gtt, and transferred to [facility] for medical management. Pt admitted to [facility] for telemetry monitoring and medical management of PE. Echo with LVEF 55% without R heart strain. L lower extremity with partially occlusive and occlusive thrombus involving L common femoral, femoral, and popliteal vein. Pt transferred to MICU for catheter-directed thrombectomy.        NEURO:  No active issues at this time.      PULM:  #Acute saddle pulmonary embolism without acute cor pulmonale  - Pt presenting from [facility] with CT finding of acute saddle PE w/ signs of R heart strain and early pulmonary infarct in the setting of one week of dyspnea on exertion and a sharp pain radiating from the back of the R shoulder through to the chest. D-dimer at [facility] 3129. Pt was started on heparin at an unclear rate and transferred to [facility] for further management. Pt w/ hx of uterine cancer, for which she underwent chemoradiation and a hysterectomy. On admission to [facility], pt with  Hx of malignancy, and Wells score 2.5. Pt denies recent surgery, trauma, kidney disease, liver disease, recent travel, prolonged immobility, history of PE/DVT, or use of medications that could increase risk of DVT/PE. Risk factors include obesity and history of malignancy.  -elevated BNP likely 2/2 R heart strain  - no repeat CTA PE protocol needed here  - TTE performed 9/16; LVEF 55%, no R heart strain, normal RA and RV size, normal PASP  - L lower extremity +partially occlusive and occlusive thrombus involving L common femoral, femoral, and popliteal vein  - S/p catheter-directed thrombectomy 9/17  - on tPA gtt, trending PT, PTT, fibrinogen, platelets q4hr   - PA pressures q8hr  - on hep gtt  - monitor closely for HD changes  - continue supplemental O2 with NC to maintain O2 >94%  - Eventually transition to anticoagulant      CV:  # SIRS  - Pt presented to [facility] meeting SIRS criteria with T 101.3 WBC 12.44, , lactate 2.4. Unclear source of infection during initial workup. Pt was given tylenol, zithromax, rocephin, asa, 2.7L LR. Though pt met SIRS criteria, the fever, leukocytosis, tachycardia and lactate are most likely secondary to PE  - would not continue antibiotics at this time without clear infectious source  - consider sepsis workup if pt spikes or develops signs/symptoms of infection.     # Hypertension  - Pt with reported Hx of HTN, states she used to take medication, but stopped 2-3 years ago when she stopped seeing a doctor regularly.   - BP on arrival to [facility] 183/90.  - Monitor BP for now  - Would consider starting anti-hypertensive if HTN persists.      VASCULAR:  # Lower extremity edema  - Pt reports worsening lower extremity swelling of legs ever since radiation therapy for her uterine cancer, last treatment in 2015.  - L>R lower extremity erythema and warmth; both 2+ pitting  - possible DVT for cause of PE vs cellulitis  - L lower extremity +partially occlusive and occlusive thrombus involving L common femoral, femoral, and popliteal vein.       HEME/ONC:  # Malignant neoplasm of uterus  - Hx of uterine cancer, underwent chemoradiation and hysterectomy 3-4 years ago. Has not followed with an oncologist since.  - not currently an active issue.       F: Not indicated at this time  E: Replete PRN; K>4, Mg>2  N: DASH/TLC diet  DVT PPx: Heparin gtt, tPA gtt  GI ppx: none  Code: FULL  Dispo: MICU adjuvant chemotherapy, radiation and tamoxifen.       Review of Systems   Constitutional: Positive for fatigue. Negative for fever.   HENT: Negative for congestion, mouth sores, rhinorrhea, sore throat and trouble swallowing.    Eyes: Negative for pain, redness and visual disturbance.   Respiratory: Positive for cough (improving). Negative for chest tightness and shortness of breath.    Cardiovascular: Negative for chest pain, palpitations and leg swelling.   Gastrointestinal: Negative for abdominal pain, blood in stool, constipation, diarrhea, nausea and vomiting.   Genitourinary: Negative for dysuria, hematuria and vaginal bleeding.   Musculoskeletal: Negative for arthralgias, back pain and myalgias.   Skin: Positive for rash. Negative for pallor.   Neurological: Negative for dizziness, weakness and headaches.   Hematological: Negative for adenopathy. Does not bruise/bleed easily.   Psychiatric/Behavioral: Negative for dysphoric mood and suicidal ideas. The patient is not nervous/anxious.        Objective:      Physical Exam   Constitutional: She is oriented to person, place, and time. She appears well-developed and well-nourished. No distress.   ECOG 0  Presents alone   HENT:   Head: Normocephalic.   Mouth/Throat: Oropharynx is clear and moist. No oropharyngeal exudate.   Eyes: Conjunctivae and EOM are normal. Pupils are equal, round, and reactive to light. No scleral icterus.   Neck: Normal range of motion. Neck supple. No thyromegaly present.   Cardiovascular: Normal rate, regular rhythm, normal heart sounds and intact distal pulses.    Pulmonary/Chest: Effort normal and breath sounds normal. No respiratory distress.   Right breast reconstruction without mass, nodule or skin changes. Left breast reconstruction with firmness at medial aspect- consistent with scar tissue. No  No axillary or supraclavicular adenopathy.    Abdominal: Soft. Bowel sounds are normal. She exhibits no distension and no mass. There is no  tenderness.   No hepatosplenomegaly     Musculoskeletal: Normal range of motion. She exhibits no edema or tenderness.   No spinal or paraspinal tenderness to palpation     Lymphadenopathy:     She has no cervical adenopathy.   Neurological: She is alert and oriented to person, place, and time. No cranial nerve deficit.   No spinal or paraspinal tenderness to palpation     Skin: Skin is warm and dry. No rash noted.   Psychiatric: She has a normal mood and affect. Her behavior is normal. Judgment and thought content normal.   Vitals reviewed.      Assessment:       1. Malignant neoplasm of lower-outer quadrant of left breast of female, estrogen receptor positive    2. Osteopenia, unspecified location        Plan:       Continue letrozole and return to clinic in 3 months.     Continue Prolia as h/o osteoporosis and currently on Aromatase inhibitor therapy.  She remains on calcium/vitamin D.    Patient due for surveillance colonoscopy.  .         73yo F with PMH of HTN (not on medications) and uterine CA (s/p CHRISTIE, chemotherapy, and radiation, last chemo tx 3-4yrs ago) who was transferred to [facility] from [facility] after found to have submassive PE on imaging. Started on heparin gtt, and transferred to [facility] for medical management. Pt admitted to [facility] for telemetry monitoring and medical management of PE. Echo with LVEF 55% without R heart strain. L lower extremity with partially occlusive and occlusive thrombus involving L common femoral, femoral, and popliteal vein. Pt transferred to MICU for catheter-directed thrombectomy.        NEURO:  No active issues at this time.      PULM:  #Acute saddle pulmonary embolism without acute cor pulmonale  - Pt presenting from [facility] with CT finding of acute saddle PE w/ signs of R heart strain and early pulmonary infarct in the setting of one week of dyspnea on exertion and a sharp pain radiating from the back of the R shoulder through to the chest. D-dimer at [facility] 3129. Pt was started on heparin at an unclear rate and transferred to [facility] for further management. Pt w/ hx of uterine cancer, for which she underwent chemoradiation and a hysterectomy. On admission to [facility], pt with  Hx of malignancy, and Wells score 2.5. Pt denies recent surgery, trauma, kidney disease, liver disease, recent travel, prolonged immobility, history of PE/DVT, or use of medications that could increase risk of DVT/PE. Risk factors include obesity and history of malignancy.  -elevated BNP likely 2/2 R heart strain  - no repeat CTA PE protocol needed here  - TTE performed 9/16; LVEF 55%, no R heart strain, normal RA and RV size, normal PASP  - L lower extremity +partially occlusive and occlusive thrombus involving L common femoral, femoral, and popliteal vein  - S/p catheter-directed thrombectomy 9/17  - stopped tPA gtt at 1:30 pm 9/18, trending PT, PTT, fibrinogen, platelets q4hr  - PA pressures q8hr  - hold hep gtt for catheter and sheath removal on 9/18, and will resume 1 hour after  - monitor closely for HD changes  - continue supplemental O2 with NC to maintain O2 >94%  - Eventually transition to anticoagulant      CV:  # SIRS  - Pt presented to [facility] meeting SIRS criteria with T 101.3 WBC 12.44, , lactate 2.4. Unclear source of infection during initial workup. Pt was given tylenol, zithromax, rocephin, asa, 2.7L LR. Though pt met SIRS criteria, the fever, leukocytosis, tachycardia and lactate are most likely secondary to PE  - will not continue antibiotics at this time without clear infectious source  - consider sepsis workup if pt spikes or develops signs/symptoms of infection.     # Hypertension  - Pt with reported Hx of HTN, states she used to take medication, but stopped 2-3 years ago when she stopped seeing a doctor regularly.   - BP on arrival to [facility] 183/90.  - Monitor BP for now  - Would consider starting anti-hypertensive if HTN persists.      VASCULAR:  # Lower extremity edema  - Pt reports worsening lower extremity swelling of legs ever since radiation therapy for her uterine cancer, last treatment in 2015.  - L>R lower extremity erythema and warmth; both 2+ pitting  - possible DVT for cause of PE vs cellulitis  - L lower extremity +partially occlusive and occlusive thrombus involving L common femoral, femoral, and popliteal vein.       HEME/ONC:  # Malignant neoplasm of uterus  - Hx of uterine cancer, underwent chemoradiation and hysterectomy 3-4 years ago. Has not followed with an oncologist since.  - not currently an active issue.       F: Not indicated at this time  E: Replete PRN; K>4, Mg>2  N: DASH/TLC diet  DVT PPx: Heparin gtt, tPA gtt  GI ppx: none  Code: FULL  Dispo: MICU 75yo F with PMH of HTN (not on medications) and uterine CA (s/p CHRISTIE, chemotherapy, and radiation, last chemo tx 3-4yrs ago) who was transferred to [facility] from [facility] after found to have submassive PE on imaging. Started on heparin gtt, and transferred to [facility] for medical management. Pt admitted to [facility] for telemetry monitoring and medical management of PE. Echo with LVEF 55% without R heart strain. L lower extremity with partially occlusive and occlusive thrombus involving L common femoral, femoral, and popliteal vein. Pt transferred to MICU for catheter-directed thrombectomy.        NEURO:  No active issues at this time.      PULM:  #Acute saddle pulmonary embolism without acute cor pulmonale  - Pt presenting from [facility] with CT finding of acute saddle PE w/ signs of R heart strain and early pulmonary infarct in the setting of one week of dyspnea on exertion and a sharp pain radiating from the back of the R shoulder through to the chest. D-dimer at [facility] 3129. Pt was started on heparin at an unclear rate and transferred to [facility] for further management. Pt w/ hx of uterine cancer, for which she underwent chemoradiation and a hysterectomy. On admission to [facility], pt with  Hx of malignancy, and Wells score 2.5. Pt denies recent surgery, trauma, kidney disease, liver disease, recent travel, prolonged immobility, history of PE/DVT, or use of medications that could increase risk of DVT/PE. Risk factors include obesity and history of malignancy.  -elevated BNP likely 2/2 R heart strain  - no repeat CTA PE protocol needed here  - TTE performed 9/16; LVEF 55%, no R heart strain, normal RA and RV size, normal PASP  - L lower extremity +partially occlusive and occlusive thrombus involving L common femoral, femoral, and popliteal vein  - S/p catheter-directed thrombectomy 9/17  - stopped tPA gtt at 1:30 pm 9/18, trending PT, PTT, fibrinogen, platelets q4hr  - PA pressures q8hr  - hold hep gtt for catheter and sheath removal on 9/18, and will resume 1 hour after  - monitor closely for HD changes  - continue supplemental O2 with NC to maintain O2 >94%  - Eventually transition to anticoagulant  - F/u pulm recs      CV:  # SIRS  - Pt presented to [facility] meeting SIRS criteria with T 101.3 WBC 12.44, , lactate 2.4. Unclear source of infection during initial workup. Pt was given tylenol, zithromax, rocephin, asa, 2.7L LR. Though pt met SIRS criteria, the fever, leukocytosis, tachycardia and lactate are most likely secondary to PE  - will not continue antibiotics at this time without clear infectious source  - consider sepsis workup if pt spikes or develops signs/symptoms of infection.     # Hypertension  - Pt with reported Hx of HTN, states she used to take medication, but stopped 2-3 years ago when she stopped seeing a doctor regularly.   - BP on arrival to [facility] 183/90.  - Monitor BP for now  - Would consider starting anti-hypertensive if HTN persists.      VASCULAR:  # Lower extremity edema  - Pt reports worsening lower extremity swelling of legs ever since radiation therapy for her uterine cancer, last treatment in 2015.  - L>R lower extremity erythema and warmth; both 2+ pitting  - possible DVT for cause of PE vs cellulitis  - L lower extremity +partially occlusive and occlusive thrombus involving L common femoral, femoral, and popliteal vein.       HEME/ONC:  # Malignant neoplasm of uterus  - Hx of uterine cancer, underwent chemoradiation and hysterectomy 3-4 years ago. Has not followed with an oncologist since.  - not currently an active issue.       F: Not indicated at this time  E: Replete PRN; K>4, Mg>2  N: DASH/TLC diet  DVT PPx: Heparin gtt, tPA gtt  GI ppx: none  Code: FULL  Dispo: MICU

## 2019-09-18 NOTE — DIETITIAN INITIAL EVALUATION ADULT. - PROBLEM SELECTOR PLAN 1
Pt presenting from [facility] with CT finding of acute saddle PE w/ signs of R heart strain and early pulmonary infarct in the setting of one week of dyspnea on exertion and a sharp pain radiating from the back of the R shoulder through to the chest. D-dimer at [facility] 3129. Pt was started on heparin at an unclear rate and transferred to [facility] for further management. Pt w/ hx of uterine cancer, for which she underwent chemoradiation and a hysterectomy. On admission to [facility], pt with  Hx of malignancy, and Wells score 2.5. Pt denies recent surgery, trauma, kidney disease, liver disease, recent travel, prolonged immobility, history of PE/DVT, or use of medications that could increase risk of DVT/PE. Risk factors include obesity and history of malignancy.  -elevated BNP likely 2/2 R heart strain  - CTA PE protocol ordered - assess need for repeat given findings of CT at [facility]  - b/l duplex both lower and upper extremities ordered  - started on heparin at 24mL/hr  - trend PTT q4-6  - monitor closely for HD changes  - consult pulm in AM  - echocardiogram ordered  - continue supplemental O2 with NC to maintain O2 >94%

## 2019-09-18 NOTE — PROGRESS NOTE ADULT - ASSESSMENT
This is a 73yo F with medical hx significant for HTN, Uterine CA s/p CHRISTIE, chemotherapy, and radiation x 4 yrs ago, presented to OSH with SOB , found to have Acute submassive PE, transferred to [facility] for further management on 09/16.   Echocardiogram showing no right heart failure   Duplex LE showing left common femoral DVT   Patient had episode of desaturation when walking with Sat's in the low 80's per pulmonology     Plan:

## 2019-09-18 NOTE — PROGRESS NOTE ADULT - ASSESSMENT
This is a 73yo F with medical hx significant for HTN, Uterine CA s/p CHRISTIE, chemotherapy, and radiation x 4 yrs ago, presented to OSH with SOB , found to have Acute submassive PE, transferred to [facility] for further management on 09/16, s/p catheter directed TPA.

## 2019-09-18 NOTE — PROGRESS NOTE ADULT - SUBJECTIVE AND OBJECTIVE BOX
SUBJECTIVE:   Patient doing well currently   Breathing confortably        MEDICATIONS  (STANDING):  alteplase    Infusion . 1 mG/Hr (10 mL/Hr) IV Continuous <Continuous>  alteplase    Infusion . 1 mG/Hr (10 mL/Hr) IV Continuous <Continuous>  alteplase    Infusion . 0.5 mG/Hr (5 mL/Hr) IV Continuous <Continuous>  alteplase    Infusion . 0.5 mG/Hr (5 mL/Hr) IV Continuous <Continuous>  heparin  Infusion 500 Unit(s)/Hr (5 mL/Hr) IV Continuous <Continuous>  influenza   Vaccine 0.5 milliLiter(s) IntraMuscular once    MEDICATIONS  (PRN):  calamine Lotion 1 Application(s) Topical three times a day PRN Rash and/or Itching      Vital Signs Last 24 Hrs  T(C): 36.1 (17 Sep 2019 14:41), Max: 36.7 (17 Sep 2019 10:08)  T(F): 97 (17 Sep 2019 14:41), Max: 98.1 (17 Sep 2019 10:08)  HR: 74 (17 Sep 2019 15:00) (68 - 86)  BP: 154/58 (17 Sep 2019 12:41) (140/75 - 170/99)  BP(mean): 100 (17 Sep 2019 12:41) (94 - 128)  RR: 23 (17 Sep 2019 15:00) (17 - 29)  SpO2: 96% (17 Sep 2019 15:00) (92% - 98%)  I&O's Detail    16 Sep 2019 07:01  -  17 Sep 2019 07:00  --------------------------------------------------------  IN:    heparin  Infusion.: 240 mL    heparin Infusion: 54 mL  Total IN: 294 mL    OUT:  Total OUT: 0 mL    Total NET: 294 mL      17 Sep 2019 07:01  -  17 Sep 2019 15:56  --------------------------------------------------------  IN:    alteplase    Infusion.: 40 mL    alteplase    Infusion.: 40 mL    heparin Infusion: 5 mL    heparin Infusion: 15 mL  Total IN: 100 mL    OUT:  Total OUT: 0 mL    Total NET: 100 mL      PHYSICAL EXAM:   Neuro: awake and alert, no focal deficit   HEENT: normocephalic, no scleral ictera   Pulm: non labored breathing on 2L NC, lungs are clear to auscultation   CV: regular rate and rhythm, no murmur to ausculation, no carotid bruit   GI: abdomen is soft non tender to palaption, no hepatomegaly    MSK: Bilateral lower extremity swelling   Skin: no rash, no wound, no phlegmasia    Psych: cooperative, appropriate mood and affect       LABS:                        12.6   7.33  )-----------( 189      ( 17 Sep 2019 05:49 )             40.1     09-17    137  |  103  |  13  ----------------------------<  140<H>  3.8   |  24  |  0.93    Ca    8.7      17 Sep 2019 05:49  Phos  3.8     09-17  Mg     2.0     09-17    TPro  6.8  /  Alb  3.3  /  TBili  1.0  /  DBili  x   /  AST  14  /  ALT  13  /  AlkPhos  95  09-16    PT/INR - ( 17 Sep 2019 13:09 )   PT: 13.4 sec;   INR: 1.18          PTT - ( 17 Sep 2019 13:09 )  PTT:40.8 sec      RADIOLOGY & ADDITIONAL STUDIES:

## 2019-09-18 NOTE — PROGRESS NOTE ADULT - SUBJECTIVE AND OBJECTIVE BOX
HOSPITAL COURSE:  Mrs. Escobar is a 75yo F with PMH of HTN (not on medications) and uterine CA (s/p CHRISTIE, chemotherapy, and radiation, last chemo tx 3-4yrs ago), and surgical hx of cholecystectomy, SBOx2, and CHRISTIE), who was transferred to [facility] from [facility] after found to have submassive PE on imaging. Pt initially presented to [facility] overnight c/o 1 wk of SOB and fatigue, and stabbing back pain x1 day. At [facility], T101.3 rectal, , /90, RR35, O2sat 88% on RA. Placed on 2L NC with O2sat improving to 95%. Labs significant for WBC12.44 (w/ neutrophilic predominance), Hb/Hct 14.9/46.7, BUN/Cr 20/1.26. D-dimer 3129, Trop I 0.031, lactate 2.4. ABG pH 7.44, pCO2 30, pO2 77, HCO3 19.6. EKG w/ sinus tachycardia and RBBB. CTA w/ large saddle embolus, signs of R heart strain, and signs of early pulmonary infarction. For suspected sepsis, given ceftriaxone 2g, azithromycin 500mg, and 2.7L lactated ringers. Also given ASA 162mg, and tylenol 650mg.  She was started on heparin prior to transfer to [facility]. Upon arrival to [facility], patient has been hemodynamically stable, sating well 92-98 on 2L NC, HR 70s-80s, BPs 140s-170s. Hep gtt rate adjusted here per q6h PTT. TTE showed LVEF 55% without dilatation of R heart. Lower extremity doppler significant for L lower extremity partially occlusive and occlusive thrombus involving L common femoral, femoral and popliteal veins. Patient was transferred to MICU on 9/17 for catheter-directed lysis of saddle embolus.      OVERNIGHT EVENTS: NAEO.    SUBJECTIVE / INTERVAL HPI: Patient seen and examined at bedside. Pt reports improvement in breathing. Denies f/c, n/v, HA, chest pain, SOB, abdominal pain.    MEDICATIONS  (STANDING):  alteplase    Infusion . 1 mG/Hr (10 mL/Hr) IV Continuous <Continuous>  alteplase    Infusion . 1 mG/Hr (10 mL/Hr) IV Continuous <Continuous>  heparin  Infusion 2700 Unit(s)/Hr (5 mL/Hr) IV Continuous <Continuous>  influenza   Vaccine 0.5 milliLiter(s) IntraMuscular once    MEDICATIONS  (PRN):  calamine Lotion 1 Application(s) Topical three times a day PRN Rash and/or Itching    Allergies    No Known Allergies    Intolerances        VITAL SIGNS:  ICU Vital Signs Last 24 Hrs  T(C): 36.7 (18 Sep 2019 01:02), Max: 37.2 (17 Sep 2019 22:41)  T(F): 98.1 (18 Sep 2019 01:02), Max: 99 (17 Sep 2019 22:41)  HR: 70 (18 Sep 2019 05:00) (68 - 86)  BP: 154/58 (17 Sep 2019 12:41) (140/75 - 162/91)  BP(mean): 100 (17 Sep 2019 12:41) (94 - 121)  ABP: 158/72 (18 Sep 2019 05:00) (114/54 - 166/92)  ABP(mean): 108 (18 Sep 2019 05:00) (76 - 122)  RR: 21 (18 Sep 2019 05:00) (17 - 30)  SpO2: 98% (18 Sep 2019 05:00) (95% - 99%)          I&O's Summary    16 Sep 2019 07:01  -  17 Sep 2019 07:00  --------------------------------------------------------  IN: 294 mL / OUT: 0 mL / NET: 294 mL    17 Sep 2019 07:01  -  18 Sep 2019 05:50  --------------------------------------------------------  IN: 320 mL / OUT: 1000 mL / NET: -680 mL          PHYSICAL EXAM:  General: Older woman, morbidly obese, NAD, Laying comfortably in bed  HEENT: NC/AT, anicteric sclera, MMM  Neck: supple  Cardiovascular: +S1/S2, RRR, No murmurs, rubs, gallops  Respiratory: CTA B/L, no W/R/R  Gastrointestinal: Obese, soft, NT/ND, +BSx4  Extremities: WWP, 1+ pitting edema & mildly TTP b/l, no clubbing or cyanosis, b/l femoral lines in place  Vascular: 2+ radial, DP/PT pulses B/L  Neurological: AAOx3, no focal deficits      LABS:                        12.6   7.33  )-----------( 189      ( 17 Sep 2019 05:49 )             40.1     09-17    137  |  103  |  13  ----------------------------<  140<H>  3.8   |  24  |  0.93    Ca    8.7      17 Sep 2019 05:49  Phos  3.8     09-17  Mg     2.0     09-17    TPro  6.8  /  Alb  3.3  /  TBili  1.0  /  DBili  x   /  AST  14  /  ALT  13  /  AlkPhos  95  09-16    PT/INR - ( 16 Sep 2019 05:47 )   PT: 14.1 sec;   INR: 1.24     PTT - ( 17 Sep 2019 05:49 )  PTT:98.7 sec        RADIOLOGY & ADDITIONAL TESTS: Reviewed.    < from: US Duplex Venous Lower Ext Complete, Bilateral (09.16.19 @ 19:36) >  IMPRESSION:  Partially occlusive and occlusive thrombosis within the left common   femoral, femoral, and popliteal veins. Limited visualization of bilateral   distal femoral, posterior tibial, and peroneal veins due to patient's   body habitus.  < end of copied text > HOSPITAL COURSE:  Mrs. Escobar is a 73yo F with PMH of HTN (not on medications) and uterine CA (s/p CHRISTIE, chemotherapy, and radiation, last chemo tx 3-4yrs ago), and surgical hx of cholecystectomy, SBOx2, and CHRISTIE), who was transferred to [facility] from [facility] after found to have PE on imaging. Pt initially presented to [facility] overnight c/o 1 wk of SOB and fatigue, and stabbing back pain x1 day. At [facility], T101.3 rectal, , /90, RR35, O2sat 88% on RA. Placed on 2L NC with O2sat improving to 95%. Labs significant for WBC12.44 (w/ neutrophilic predominance), Hb/Hct 14.9/46.7, BUN/Cr 20/1.26. D-dimer 3129, Trop I 0.031, lactate 2.4. ABG pH 7.44, pCO2 30, pO2 77, HCO3 19.6. EKG w/ sinus tachycardia and RBBB. CTA w/ large saddle embolus, signs of R heart strain, and signs of early pulmonary infarction. For suspected sepsis, given ceftriaxone 2g, azithromycin 500mg, and 2.7L lactated ringers. Also given ASA 162mg, and tylenol 650mg.  She was started on heparin prior to transfer to [facility]. Upon arrival to [facility], patient has been hemodynamically stable, sating well 92-98 on 2L NC, HR 70s-80s, BPs 140s-170s. Hep gtt rate adjusted here per q6h PTT. TTE showed LVEF 55% without dilatation of R heart. Lower extremity doppler significant for L lower extremity partially occlusive and occlusive thrombus involving L common femoral, femoral and popliteal veins. Patient was transferred to MICU on 9/17 for catheter-directed lysis of saddle embolus.      OVERNIGHT EVENTS: Patient had back pain overnight and received 650mg of tylenol.    SUBJECTIVE / INTERVAL HPI: Patient seen and examined at bedside. Patient states that she feels much better than yesterday denying chest pain and shortness of breath. She also denies f/c, n/v, HA, abdominal pain. Patient states that her last bowel movement was on Sunday.    MEDICATIONS  (STANDING):  alteplase    Infusion . 1 mG/Hr (10 mL/Hr) IV Continuous <Continuous>  alteplase    Infusion . 1 mG/Hr (10 mL/Hr) IV Continuous <Continuous>  heparin  Infusion 2700 Unit(s)/Hr (5 mL/Hr) IV Continuous <Continuous>  influenza   Vaccine 0.5 milliLiter(s) IntraMuscular once    MEDICATIONS  (PRN):  calamine Lotion 1 Application(s) Topical three times a day PRN Rash and/or Itching    Allergies    No Known Allergies    Intolerances        VITAL SIGNS:  ICU Vital Signs Last 24 Hrs  T(C): 36.7 (18 Sep 2019 01:02), Max: 37.2 (17 Sep 2019 22:41)  T(F): 98.1 (18 Sep 2019 01:02), Max: 99 (17 Sep 2019 22:41)  HR: 70 (18 Sep 2019 05:00) (68 - 86)  BP: 154/58 (17 Sep 2019 12:41) (140/75 - 162/91)  BP(mean): 100 (17 Sep 2019 12:41) (94 - 121)  ABP: 158/72 (18 Sep 2019 05:00) (114/54 - 166/92)  ABP(mean): 108 (18 Sep 2019 05:00) (76 - 122)  RR: 21 (18 Sep 2019 05:00) (17 - 30)  SpO2: 98% (18 Sep 2019 05:00) (95% - 99%)          I&O's Summary    16 Sep 2019 07:01  -  17 Sep 2019 07:00  --------------------------------------------------------  IN: 294 mL / OUT: 0 mL / NET: 294 mL    17 Sep 2019 07:01  -  18 Sep 2019 07:00  --------------------------------------------------------  IN: 350 mL / OUT: 1000 mL / NET: -650 mL    18 Sep 2019 07:01  -  18 Sep 2019 15:58  --------------------------------------------------------  IN: 65 mL / OUT: 1000 mL / NET: -935 mL            PHYSICAL EXAM:  General: Morbidly obese female, NAD, laying comfortably in bed  HEENT: NC/AT, anicteric sclera, MMM  Neck: supple  Cardiovascular: +S1/S2, RRR, no M/R/G  Respiratory: CTA B/L, no W/R/R  Gastrointestinal: Obese, soft, mildly tender in LLQ, +BSx4  Extremities: WWP, 1+ pitting edema b/l, no clubbing or cyanosis  Vascular: 2+ radial, DP/PT pulses B/L  Neurological: AAOx3, no focal deficits        LABS:                         12.2   6.69  )-----------( 153      ( 18 Sep 2019 05:44 )             39.0     09-18    140  |  105  |  13  ----------------------------<  119<H>  4.1   |  24  |  0.87    Ca    8.4      18 Sep 2019 05:44  Phos  3.7     09-18  Mg     1.9     09-18      PT/INR - ( 18 Sep 2019 09:08 )   PT: 13.6 sec;   INR: 1.20          PTT - ( 18 Sep 2019 09:08 )  PTT:33.3 sec            RADIOLOGY & ADDITIONAL TESTS: Reviewed.    < from: US Duplex Venous Lower Ext Complete, Bilateral (09.16.19 @ 19:36) >  IMPRESSION:  Partially occlusive and occlusive thrombosis within the left common   femoral, femoral, and popliteal veins. Limited visualization of bilateral   distal femoral, posterior tibial, and peroneal veins due to patient's   body habitus.  < end of copied text >  < from: Xray Chest 1 View- PORTABLE-Routine (09.18.19 @ 06:51) >  IMPRESSION:  No airspace consolidation.    < end of copied text > HOSPITAL COURSE:  Mrs. Escobar is a 75yo F with PMH of HTN (not on medications) and uterine CA (s/p CHRISTIE, chemotherapy, and radiation, last chemo tx 3-4yrs ago), and surgical hx of cholecystectomy, SBOx2, and CHRISTIE), who was transferred to [facility] from [facility] after found to have PE on imaging. Pt initially presented to [facility] overnight c/o 1 wk of SOB and fatigue, and stabbing back pain x1 day. At [facility], T101.3 rectal, , /90, RR35, O2sat 88% on RA. Placed on 2L NC with O2sat improving to 95%. Labs significant for WBC12.44 (w/ neutrophilic predominance), Hb/Hct 14.9/46.7, BUN/Cr 20/1.26. D-dimer 3129, Trop I 0.031, lactate 2.4. ABG pH 7.44, pCO2 30, pO2 77, HCO3 19.6. EKG w/ sinus tachycardia and RBBB. CTA w/ large saddle embolus, signs of R heart strain, and signs of early pulmonary infarction. For suspected sepsis, given ceftriaxone 2g, azithromycin 500mg, and 2.7L lactated ringers. Also given ASA 162mg, and tylenol 650mg.  She was started on heparin prior to transfer to [facility]. Upon arrival to [facility], patient has been hemodynamically stable, sating well 92-98 on 2L NC, HR 70s-80s, BPs 140s-170s. Hep gtt rate adjusted here per q6h PTT. TTE showed LVEF 55% without dilatation of R heart. Lower extremity doppler significant for L lower extremity partially occlusive and occlusive thrombus involving L common femoral, femoral and popliteal veins. Patient was transferred to MICU on 9/17 for catheter-directed lysis of saddle embolus.      OVERNIGHT EVENTS: Patient had back pain overnight and received 650mg of tylenol.    SUBJECTIVE / INTERVAL HPI: Patient seen and examined at bedside. Patient states that she feels much better than yesterday denying chest pain and shortness of breath. She also denies f/c, n/v, HA, abdominal pain. Patient states that her last bowel movement was on Sunday.    MEDICATIONS  (STANDING):  influenza   Vaccine 0.5 milliLiter(s) IntraMuscular once    MEDICATIONS  (PRN):  calamine Lotion 1 Application(s) Topical three times a day PRN Rash and/or Itching    Allergies    No Known Allergies    Intolerances        VITAL SIGNS:  ICU Vital Signs Last 24 Hrs  T(C): 36.7 (18 Sep 2019 01:02), Max: 37.2 (17 Sep 2019 22:41)  T(F): 98.1 (18 Sep 2019 01:02), Max: 99 (17 Sep 2019 22:41)  HR: 70 (18 Sep 2019 05:00) (68 - 86)  BP: 154/58 (17 Sep 2019 12:41) (140/75 - 162/91)  BP(mean): 100 (17 Sep 2019 12:41) (94 - 121)  ABP: 158/72 (18 Sep 2019 05:00) (114/54 - 166/92)  ABP(mean): 108 (18 Sep 2019 05:00) (76 - 122)  RR: 21 (18 Sep 2019 05:00) (17 - 30)  SpO2: 98% (18 Sep 2019 05:00) (95% - 99%)          I&O's Summary    16 Sep 2019 07:01  -  17 Sep 2019 07:00  --------------------------------------------------------  IN: 294 mL / OUT: 0 mL / NET: 294 mL    17 Sep 2019 07:01  -  18 Sep 2019 07:00  --------------------------------------------------------  IN: 350 mL / OUT: 1000 mL / NET: -650 mL    18 Sep 2019 07:01  -  18 Sep 2019 15:58  --------------------------------------------------------  IN: 65 mL / OUT: 1000 mL / NET: -935 mL            PHYSICAL EXAM:  General: Morbidly obese female, NAD, laying comfortably in bed  HEENT: NC/AT, anicteric sclera, MMM  Neck: supple  Cardiovascular: +S1/S2, RRR, no M/R/G  Respiratory: CTA B/L, no W/R/R  Gastrointestinal: Obese, soft, mildly tender in LLQ, +BSx4  Extremities: WWP, 1+ pitting edema b/l, no clubbing or cyanosis  Vascular: 2+ radial, DP/PT pulses B/L  Neurological: AAOx3, no focal deficits        LABS:                         12.2   6.69  )-----------( 153      ( 18 Sep 2019 05:44 )             39.0     09-18    140  |  105  |  13  ----------------------------<  119<H>  4.1   |  24  |  0.87    Ca    8.4      18 Sep 2019 05:44  Phos  3.7     09-18  Mg     1.9     09-18      PT/INR - ( 18 Sep 2019 09:08 )   PT: 13.6 sec;   INR: 1.20          PTT - ( 18 Sep 2019 09:08 )  PTT:33.3 sec            RADIOLOGY & ADDITIONAL TESTS: Reviewed.    < from: US Duplex Venous Lower Ext Complete, Bilateral (09.16.19 @ 19:36) >  IMPRESSION:  Partially occlusive and occlusive thrombosis within the left common   femoral, femoral, and popliteal veins. Limited visualization of bilateral   distal femoral, posterior tibial, and peroneal veins due to patient's   body habitus.  < end of copied text >  < from: Xray Chest 1 View- PORTABLE-Routine (09.18.19 @ 06:51) >  IMPRESSION:  No airspace consolidation.    < end of copied text > MICU TO  TRANSFER NOTE    HOSPITAL COURSE:  Mrs. Escobar is a 73yo F with PMH of HTN (not on medications) and uterine CA (s/p CHRISTIE, chemotherapy, and radiation, last chemo tx 3-4yrs ago), and surgical hx of cholecystectomy, SBOx2, and CHRISTIE), who was transferred to [facility] from [facility] after found to have PE on imaging. Pt initially presented to [facility] overnight c/o 1 wk of SOB and fatigue, and stabbing back pain x1 day. At [facility], T101.3 rectal, , /90, RR35, O2sat 88% on RA. Placed on 2L NC with O2sat improving to 95%. Labs significant for WBC12.44 (w/ neutrophilic predominance), Hb/Hct 14.9/46.7, BUN/Cr 20/1.26. D-dimer 3129, Trop I 0.031, lactate 2.4. ABG pH 7.44, pCO2 30, pO2 77, HCO3 19.6. EKG w/ sinus tachycardia and RBBB. CTA w/ large saddle embolus, signs of R heart strain, and signs of early pulmonary infarction. For suspected sepsis, given ceftriaxone 2g, azithromycin 500mg, and 2.7L lactated ringers. Also given ASA 162mg, and tylenol 650mg.  She was started on heparin prior to transfer to [facility]. Upon arrival to [facility], patient has been hemodynamically stable, sating well 92-98 on 2L NC, HR 70s-80s, BPs 140s-170s. Hep gtt rate adjusted here per q6h PTT. TTE showed LVEF 55% without dilatation of R heart. Lower extremity doppler significant for L lower extremity partially occlusive and occlusive thrombus involving L common femoral, femoral and popliteal veins. Patient was transferred to MICU on 9/17 for catheter-directed lysis of saddle embolus. On 9/18 at 1:30 pm tpA was discontinued. Heparin was held for groin catheter and sheath removal. Heparin was restarted 2 hours after sheath removal and patient is hemodynamically stable and ready for step down back to .      OVERNIGHT EVENTS: Patient had back pain overnight and received 650mg of tylenol.    SUBJECTIVE / INTERVAL HPI: Patient seen and examined at bedside. Patient states that she feels much better than yesterday denying chest pain and shortness of breath. She also denies f/c, n/v, HA, abdominal pain. Patient states that her last bowel movement was on Sunday.    MEDICATIONS  (STANDING):  influenza   Vaccine 0.5 milliLiter(s) IntraMuscular once    MEDICATIONS  (PRN):  calamine Lotion 1 Application(s) Topical three times a day PRN Rash and/or Itching    Allergies    No Known Allergies    Intolerances        VITAL SIGNS:  ICU Vital Signs Last 24 Hrs  T(C): 36.7 (18 Sep 2019 01:02), Max: 37.2 (17 Sep 2019 22:41)  T(F): 98.1 (18 Sep 2019 01:02), Max: 99 (17 Sep 2019 22:41)  HR: 70 (18 Sep 2019 05:00) (68 - 86)  BP: 154/58 (17 Sep 2019 12:41) (140/75 - 162/91)  BP(mean): 100 (17 Sep 2019 12:41) (94 - 121)  ABP: 158/72 (18 Sep 2019 05:00) (114/54 - 166/92)  ABP(mean): 108 (18 Sep 2019 05:00) (76 - 122)  RR: 21 (18 Sep 2019 05:00) (17 - 30)  SpO2: 98% (18 Sep 2019 05:00) (95% - 99%)          I&O's Summary    16 Sep 2019 07:01  -  17 Sep 2019 07:00  --------------------------------------------------------  IN: 294 mL / OUT: 0 mL / NET: 294 mL    17 Sep 2019 07:01  -  18 Sep 2019 07:00  --------------------------------------------------------  IN: 350 mL / OUT: 1000 mL / NET: -650 mL    18 Sep 2019 07:01  -  18 Sep 2019 15:58  --------------------------------------------------------  IN: 65 mL / OUT: 1000 mL / NET: -935 mL            PHYSICAL EXAM:  General: Morbidly obese female, NAD, laying comfortably in bed  HEENT: NC/AT, anicteric sclera, MMM  Neck: supple  Cardiovascular: +S1/S2, RRR, no M/R/G  Respiratory: CTA B/L, no W/R/R  Gastrointestinal: Obese, soft, mildly tender in LLQ, +BSx4  Extremities: WWP, 1+ pitting edema b/l, no clubbing or cyanosis  Vascular: 2+ radial, DP/PT pulses B/L  Neurological: AAOx3, no focal deficits        LABS:                         12.2   6.69  )-----------( 153      ( 18 Sep 2019 05:44 )             39.0     09-18    140  |  105  |  13  ----------------------------<  119<H>  4.1   |  24  |  0.87    Ca    8.4      18 Sep 2019 05:44  Phos  3.7     09-18  Mg     1.9     09-18      PT/INR - ( 18 Sep 2019 09:08 )   PT: 13.6 sec;   INR: 1.20          PTT - ( 18 Sep 2019 09:08 )  PTT:33.3 sec            RADIOLOGY & ADDITIONAL TESTS: Reviewed.    < from: US Duplex Venous Lower Ext Complete, Bilateral (09.16.19 @ 19:36) >  IMPRESSION:  Partially occlusive and occlusive thrombosis within the left common   femoral, femoral, and popliteal veins. Limited visualization of bilateral   distal femoral, posterior tibial, and peroneal veins due to patient's   body habitus.  < end of copied text >  < from: Xray Chest 1 View- PORTABLE-Routine (09.18.19 @ 06:51) >  IMPRESSION:  No airspace consolidation.    < end of copied text >

## 2019-09-18 NOTE — PROGRESS NOTE ADULT - PROBLEM SELECTOR PLAN 5
History of uterine cancer status post chemoradiation and hysterectomy 4 years ago. Not currently following with an oncologist.    -NTD

## 2019-09-18 NOTE — DIETITIAN INITIAL EVALUATION ADULT. - ADD RECOMMEND
1. Monitor lytes and replete prn. POC BG q6hrs 2. Daily wts 3. Reinforce diet ed 4. Pain and bowel regimens per team

## 2019-09-18 NOTE — DIETITIAN INITIAL EVALUATION ADULT. - PROBLEM SELECTOR PLAN 3
Pt with reported Hx of HTN, states she used to take medication, but stopped 2-3 years ago when she stopped seeing a doctor regularly. BP on arrival to [facility] 183/90.  - Monitor BP for now  - Would consider starting anti-hypertensive if HTN persists

## 2019-09-18 NOTE — PROGRESS NOTE ADULT - SUBJECTIVE AND OBJECTIVE BOX
PULMONARY CONSULT FOLLOW-UP NOTE    INTERVAL HISTORY:   Reviewed chart and overnight events; patient seen and examined at bedside.  O/N : no significant event, hypertensive. On 2 L NC  this AM : no SOB.     MEDICATIONS:  Pulmonary:    Antimicrobials:    Anticoagulants:  heparin  Infusion 2400 Unit(s)/Hr IV Continuous <Continuous>    Cardiac:      Allergies    No Known Allergies    Intolerances        Vital Signs Last 24 Hrs  T(C): 37.2 (18 Sep 2019 18:31), Max: 37.2 (17 Sep 2019 22:41)  T(F): 98.9 (18 Sep 2019 18:31), Max: 99 (17 Sep 2019 22:41)  HR: 82 (18 Sep 2019 19:21) (70 - 84)  BP: 155/70 (18 Sep 2019 19:21) (155/70 - 155/70)  BP(mean): 112 (18 Sep 2019 19:21) (112 - 112)  RR: 23 (18 Sep 2019 19:21) (17 - 30)  SpO2: 97% (18 Sep 2019 19:21) (93% - 99%)    09-17 @ 07:01 - 09-18 @ 07:00  --------------------------------------------------------  IN: 350 mL / OUT: 1000 mL / NET: -650 mL    09-18 @ 07:01  - 09-18 @ 19:29  --------------------------------------------------------  IN: 65 mL / OUT: 1000 mL / NET: -935 mL      PHYSICAL EXAM:  Constitutional: oriented, not in distress.   HEENT: NC/AT; PERRL, anicteric sclera; MMM  Neck: supple  Cardiovascular: +S1/S2, RRR  Respiratory: CTA B/L; no W/R/R  Gastrointestinal: soft, NT/ND; +BSx4  Extremities:  pitting edema +, bilateral femoral cath with sheath present.   Vascular: 2+ radial, DP/PT pulses B/L  Neurological: AAOx3; no focal deficits    LABS:      CBC Full  -  ( 18 Sep 2019 05:44 )  WBC Count : 6.69 K/uL  RBC Count : 4.46 M/uL  Hemoglobin : 12.2 g/dL  Hematocrit : 39.0 %  Platelet Count - Automated : 153 K/uL  Mean Cell Volume : 87.4 fl  Mean Cell Hemoglobin : 27.4 pg  Mean Cell Hemoglobin Concentration : 31.3 gm/dL  Auto Neutrophil # : x  Auto Lymphocyte # : x  Auto Monocyte # : x  Auto Eosinophil # : x  Auto Basophil # : x  Auto Neutrophil % : x  Auto Lymphocyte % : x  Auto Monocyte % : x  Auto Eosinophil % : x  Auto Basophil % : x    09-18    140  |  105  |  13  ----------------------------<  119<H>  4.1   |  24  |  0.87    Ca    8.4      18 Sep 2019 05:44  Phos  3.7     09-18  Mg     1.9     09-18      PT/INR - ( 18 Sep 2019 18:50 )   PT: 13.9 sec;   INR: 1.22          PTT - ( 18 Sep 2019 18:50 )  PTT:30.3 sec                      RADIOLOGY & ADDITIONAL STUDIES:  reviewed.

## 2019-09-18 NOTE — PROGRESS NOTE ADULT - SUBJECTIVE AND OBJECTIVE BOX
SUBJECTIVE: Patient seen and examined at the bedside.     Vital Signs Last 12 Hrs  T(F): 98.9 (09-18-19 @ 18:31), Max: 98.9 (09-18-19 @ 18:31)  HR: 80 (09-18-19 @ 20:00) (70 - 82)  BP: 151/70 (09-18-19 @ 20:00) (151/70 - 155/70)  BP(mean): 107 (09-18-19 @ 20:00) (107 - 112)  RR: 26 (09-18-19 @ 20:00) (17 - 27)  SpO2: 98% (09-18-19 @ 20:00) (93% - 98%)  I&O's Summary    17 Sep 2019 07:01  -  18 Sep 2019 07:00  --------------------------------------------------------  IN: 350 mL / OUT: 1000 mL / NET: -650 mL    18 Sep 2019 07:01  -  18 Sep 2019 21:12  --------------------------------------------------------  IN: 89 mL / OUT: 1000 mL / NET: -911 mL        PHYSICAL EXAM:  General: In no acute distress, resting comfortably in bed  HEENT: NCAT, PERRL, EOMI, no conjunctival pallor or scleral icterus, MMM  Neck: Supple, no JVD  Respiratory: Clear to auscultation bilaterally with no wheezes, rales, or rhonchi appreciated  Cardiovascular: RRR, normal S1 and S2, no murmurs, rubs, or gallops appreciated  Vascular: 2+ radial and DP pulses  Abdomen: Soft, NT/ND. Bowel sounds present in all four quadrants with no guarding, rebound tenderness, or palpable masses  Extremities: Warm and well perfused. No clubbing, cyanosis, or edema noted  Skin: No gross skin abnormalities or rashes noted  Neuro: AAOx3 with no cranial nerve deficits. Strength and sensation intact throughout.    LABS:                        12.2   6.69  )-----------( 153      ( 18 Sep 2019 05:44 )             39.0     09-18    140  |  105  |  13  ----------------------------<  119<H>  4.1   |  24  |  0.87    Ca    8.4      18 Sep 2019 05:44  Phos  3.7     09-18  Mg     1.9     09-18      PT/INR - ( 18 Sep 2019 18:50 )   PT: 13.9 sec;   INR: 1.22          PTT - ( 18 Sep 2019 18:50 )  PTT:30.3 sec      RADIOLOGY & ADDITIONAL TESTS: Reviewed.    MEDICATIONS  (STANDING):  heparin  Infusion 2400 Unit(s)/Hr (24 mL/Hr) IV Continuous <Continuous>  influenza   Vaccine 0.5 milliLiter(s) IntraMuscular once    MEDICATIONS  (PRN):  calamine Lotion 1 Application(s) Topical three times a day PRN Rash and/or Itching      Allergies    No Known Allergies    Intolerances PGY-1 ACCEPTANCE NOTE - 7EA TO [facility]    HOSPITAL COURSE  Mrs. Escobar is a 74-year-old female with a past medical history of HTN not on medications, uterine cancer status post chemoradiation and CHRISTIE 4 years ago, and two SBO episodes who was transferred from [facility] and being found to have a submassive saddle pulmonary embolism.  TTE while inpatient: EF 55% without right heart dilation. She had a lower extremity doppler significant for left lower extremity partially occlusive and occlusive thrombi of the left common femoral, femoral, and popliteal veins. Initially admitted to [facility] but after becoming hypoxic she was transferred to the MICU for catheter directed thrombolysis. She is now status post embolism lysis, sheath has been removed, she has remained hemodynamically stable, andis ready for stepdown back to [facility].    SUBJECTIVE: Patient seen and examined at the bedside.     Vital Signs Last 12 Hrs  T(F): 98.9 (09-18-19 @ 18:31), Max: 98.9 (09-18-19 @ 18:31)  HR: 80 (09-18-19 @ 20:00) (70 - 82)  BP: 151/70 (09-18-19 @ 20:00) (151/70 - 155/70)  BP(mean): 107 (09-18-19 @ 20:00) (107 - 112)  RR: 26 (09-18-19 @ 20:00) (17 - 27)  SpO2: 98% (09-18-19 @ 20:00) (93% - 98%)  I&O's Summary    17 Sep 2019 07:01  -  18 Sep 2019 07:00  --------------------------------------------------------  IN: 350 mL / OUT: 1000 mL / NET: -650 mL    18 Sep 2019 07:01  -  18 Sep 2019 21:12  --------------------------------------------------------  IN: 89 mL / OUT: 1000 mL / NET: -911 mL        PHYSICAL EXAM:  General: In no acute distress, resting comfortably in bed  HEENT: NCAT, PERRL, EOMI, no conjunctival pallor or scleral icterus, MMM  Neck: Supple, no JVD  Respiratory: Clear to auscultation bilaterally with no wheezes, rales, or rhonchi appreciated  Cardiovascular: RRR, normal S1 and S2, no murmurs, rubs, or gallops appreciated  Vascular: 2+ radial and DP pulses  Abdomen: Soft, NT/ND. Bowel sounds present in all four quadrants with no guarding, rebound tenderness, or palpable masses  Extremities: Warm and well perfused. No clubbing, cyanosis, or edema noted  Skin: No gross skin abnormalities or rashes noted  Neuro: AAOx3 with no cranial nerve deficits. Strength and sensation intact throughout.    LABS:                        12.2   6.69  )-----------( 153      ( 18 Sep 2019 05:44 )             39.0     09-18    140  |  105  |  13  ----------------------------<  119<H>  4.1   |  24  |  0.87    Ca    8.4      18 Sep 2019 05:44  Phos  3.7     09-18  Mg     1.9     09-18      PT/INR - ( 18 Sep 2019 18:50 )   PT: 13.9 sec;   INR: 1.22          PTT - ( 18 Sep 2019 18:50 )  PTT:30.3 sec      RADIOLOGY & ADDITIONAL TESTS: Reviewed.    MEDICATIONS  (STANDING):  heparin  Infusion 2400 Unit(s)/Hr (24 mL/Hr) IV Continuous <Continuous>  influenza   Vaccine 0.5 milliLiter(s) IntraMuscular once    MEDICATIONS  (PRN):  calamine Lotion 1 Application(s) Topical three times a day PRN Rash and/or Itching      Allergies    No Known Allergies    Intolerances PGY-1 ACCEPTANCE NOTE - 7EA TO [facility]    HOSPITAL COURSE  Mrs. Escobar is a 74-year-old female with a past medical history of HTN not on medications, uterine cancer status post chemoradiation and CHRISTIE 4 years ago, and two SBO episodes who was transferred from [facility] and being found to have a submassive saddle pulmonary embolism.  TTE while inpatient: EF 55% without right heart dilation. She had a lower extremity doppler significant for left lower extremity partially occlusive and occlusive thrombi of the left common femoral, femoral, and popliteal veins. Initially admitted to [facility] but after becoming hypoxic she was transferred to the MICU for catheter directed thrombolysis. She is now status post embolism lysis, sheath has been removed, she has remained hemodynamically stable, andis ready for stepdown back to [facility].    SUBJECTIVE: Patient seen and examined at the bedside. Patient says she feels well and is not in pain. No N/V/D/CP/SOB    Vital Signs Last 12 Hrs  T(F): 98.9 (09-18-19 @ 18:31), Max: 98.9 (09-18-19 @ 18:31)  HR: 80 (09-18-19 @ 20:00) (70 - 82)  BP: 151/70 (09-18-19 @ 20:00) (151/70 - 155/70)  BP(mean): 107 (09-18-19 @ 20:00) (107 - 112)  RR: 26 (09-18-19 @ 20:00) (17 - 27)  SpO2: 98% (09-18-19 @ 20:00) (93% - 98%)  I&O's Summary    17 Sep 2019 07:01  -  18 Sep 2019 07:00  --------------------------------------------------------  IN: 350 mL / OUT: 1000 mL / NET: -650 mL    18 Sep 2019 07:01  -  18 Sep 2019 21:12  --------------------------------------------------------  IN: 89 mL / OUT: 1000 mL / NET: -911 mL        PHYSICAL EXAM:  General: Obese female resting comfortably in bed, in no acute distress  HEENT: NCAT, PERRL, MMM  Neck: Supple, no JVD  Respiratory: Clear to auscultation bilaterally with no wheezes, rales, or rhonchi appreciated. Occasional cough.  Cardiovascular: RRR, normal S1 and S2, no murmurs, rubs, or gallops appreciated  Vascular: 2+ radial and DP pulses. Bilateral groin site bandages are clean, dry, and intact. Tender to palpation at both sheath sites.  Abdomen: Soft, NT/ND. Bowel sounds present in all four quadrants with no guarding, rebound tenderness, or palpable masses  Extremities: Warm and well perfused. No clubbing, cyanosis, 1+ lower extremity edema  Skin: No gross skin abnormalities or rashes noted.  Neuro: AAOx3. Strength and sensation intact throughout.    LABS:                        12.2   6.69  )-----------( 153      ( 18 Sep 2019 05:44 )             39.0     09-18    140  |  105  |  13  ----------------------------<  119<H>  4.1   |  24  |  0.87    Ca    8.4      18 Sep 2019 05:44  Phos  3.7     09-18  Mg     1.9     09-18      PT/INR - ( 18 Sep 2019 18:50 )   PT: 13.9 sec;   INR: 1.22          PTT - ( 18 Sep 2019 18:50 )  PTT:30.3 sec      RADIOLOGY & ADDITIONAL TESTS: Reviewed.    MEDICATIONS  (STANDING):  heparin  Infusion 2400 Unit(s)/Hr (24 mL/Hr) IV Continuous <Continuous>  influenza   Vaccine 0.5 milliLiter(s) IntraMuscular once    MEDICATIONS  (PRN):  calamine Lotion 1 Application(s) Topical three times a day PRN Rash and/or Itching      Allergies    No Known Allergies    Intolerances

## 2019-09-18 NOTE — DIETITIAN INITIAL EVALUATION ADULT. - PROBLEM SELECTOR PLAN 2
Pt presented to [facility] meeting SIRS criteria with T 101.3 WBC 12.44, , lactate 2.4. Unclear source of infection during initial workup. Pt was given tylenol, zithromax, rocephin, asa, 2.7L LR. Though pt met SIRS criteria, the fever, leukocytosis, tachycardia and lactate are most likely secondary to PE  - would not continue antibiotics at this time without clear infectious source  - consider sepsis workup if pt spikes or develops signs/symptoms of infection  - f/u repeat lactate

## 2019-09-19 ENCOUNTER — TRANSCRIPTION ENCOUNTER (OUTPATIENT)
Age: 74
End: 2019-09-19

## 2019-09-19 LAB
ALBUMIN SERPL ELPH-MCNC: 3.4 G/DL — SIGNIFICANT CHANGE UP (ref 3.3–5)
ALP SERPL-CCNC: 94 U/L — SIGNIFICANT CHANGE UP (ref 40–120)
ALT FLD-CCNC: 10 U/L — SIGNIFICANT CHANGE UP (ref 10–45)
ANION GAP SERPL CALC-SCNC: 9 MMOL/L — SIGNIFICANT CHANGE UP (ref 5–17)
APTT BLD: 100.3 SEC — HIGH (ref 27.5–36.3)
APTT BLD: 66.4 SEC — HIGH (ref 27.5–36.3)
APTT BLD: 80.8 SEC — HIGH (ref 27.5–36.3)
AST SERPL-CCNC: 11 U/L — SIGNIFICANT CHANGE UP (ref 10–40)
BILIRUB SERPL-MCNC: 0.7 MG/DL — SIGNIFICANT CHANGE UP (ref 0.2–1.2)
BUN SERPL-MCNC: 9 MG/DL — SIGNIFICANT CHANGE UP (ref 7–23)
CALCIUM SERPL-MCNC: 8.9 MG/DL — SIGNIFICANT CHANGE UP (ref 8.4–10.5)
CHLORIDE SERPL-SCNC: 100 MMOL/L — SIGNIFICANT CHANGE UP (ref 96–108)
CO2 SERPL-SCNC: 29 MMOL/L — SIGNIFICANT CHANGE UP (ref 22–31)
CREAT SERPL-MCNC: 0.89 MG/DL — SIGNIFICANT CHANGE UP (ref 0.5–1.3)
GLUCOSE SERPL-MCNC: 123 MG/DL — HIGH (ref 70–99)
HCT VFR BLD CALC: 40.4 % — SIGNIFICANT CHANGE UP (ref 34.5–45)
HGB BLD-MCNC: 12.6 G/DL — SIGNIFICANT CHANGE UP (ref 11.5–15.5)
INR BLD: 1.17 — HIGH (ref 0.88–1.16)
MAGNESIUM SERPL-MCNC: 1.9 MG/DL — SIGNIFICANT CHANGE UP (ref 1.6–2.6)
MCHC RBC-ENTMCNC: 27.2 PG — SIGNIFICANT CHANGE UP (ref 27–34)
MCHC RBC-ENTMCNC: 31.2 GM/DL — LOW (ref 32–36)
MCV RBC AUTO: 87.3 FL — SIGNIFICANT CHANGE UP (ref 80–100)
NRBC # BLD: 0 /100 WBCS — SIGNIFICANT CHANGE UP (ref 0–0)
PLATELET # BLD AUTO: 149 K/UL — LOW (ref 150–400)
POTASSIUM SERPL-MCNC: 4.4 MMOL/L — SIGNIFICANT CHANGE UP (ref 3.5–5.3)
POTASSIUM SERPL-SCNC: 4.4 MMOL/L — SIGNIFICANT CHANGE UP (ref 3.5–5.3)
PROT SERPL-MCNC: 7 G/DL — SIGNIFICANT CHANGE UP (ref 6–8.3)
PROTHROM AB SERPL-ACNC: 13.3 SEC — HIGH (ref 10–12.9)
RBC # BLD: 4.63 M/UL — SIGNIFICANT CHANGE UP (ref 3.8–5.2)
RBC # FLD: 14.6 % — HIGH (ref 10.3–14.5)
SODIUM SERPL-SCNC: 138 MMOL/L — SIGNIFICANT CHANGE UP (ref 135–145)
WBC # BLD: 7.63 K/UL — SIGNIFICANT CHANGE UP (ref 3.8–10.5)
WBC # FLD AUTO: 7.63 K/UL — SIGNIFICANT CHANGE UP (ref 3.8–10.5)

## 2019-09-19 PROCEDURE — 99233 SBSQ HOSP IP/OBS HIGH 50: CPT | Mod: GC

## 2019-09-19 RX ORDER — APIXABAN 2.5 MG/1
1 TABLET, FILM COATED ORAL
Qty: 60 | Refills: 0
Start: 2019-09-19 | End: 2019-10-18

## 2019-09-19 RX ORDER — ACETAMINOPHEN 500 MG
650 TABLET ORAL ONCE
Refills: 0 | Status: COMPLETED | OUTPATIENT
Start: 2019-09-19 | End: 2019-09-19

## 2019-09-19 RX ORDER — IPRATROPIUM/ALBUTEROL SULFATE 18-103MCG
3 AEROSOL WITH ADAPTER (GRAM) INHALATION ONCE
Refills: 0 | Status: COMPLETED | OUTPATIENT
Start: 2019-09-19 | End: 2019-09-19

## 2019-09-19 RX ORDER — APIXABAN 2.5 MG/1
2 TABLET, FILM COATED ORAL
Qty: 28 | Refills: 0
Start: 2019-09-19 | End: 2019-09-25

## 2019-09-19 RX ORDER — LISINOPRIL 2.5 MG/1
5 TABLET ORAL AT BEDTIME
Refills: 0 | Status: DISCONTINUED | OUTPATIENT
Start: 2019-09-19 | End: 2019-09-20

## 2019-09-19 RX ORDER — APIXABAN 2.5 MG/1
10 TABLET, FILM COATED ORAL EVERY 12 HOURS
Refills: 0 | Status: DISCONTINUED | OUTPATIENT
Start: 2019-09-19 | End: 2019-09-20

## 2019-09-19 RX ORDER — LISINOPRIL 2.5 MG/1
1 TABLET ORAL
Qty: 30 | Refills: 0
Start: 2019-09-19 | End: 2019-10-18

## 2019-09-19 RX ADMIN — Medication 100 MILLIGRAM(S): at 21:36

## 2019-09-19 RX ADMIN — Medication 650 MILLIGRAM(S): at 00:17

## 2019-09-19 RX ADMIN — APIXABAN 10 MILLIGRAM(S): 2.5 TABLET, FILM COATED ORAL at 18:09

## 2019-09-19 RX ADMIN — HEPARIN SODIUM 24 UNIT(S)/HR: 5000 INJECTION INTRAVENOUS; SUBCUTANEOUS at 05:55

## 2019-09-19 RX ADMIN — Medication 650 MILLIGRAM(S): at 01:15

## 2019-09-19 RX ADMIN — LISINOPRIL 5 MILLIGRAM(S): 2.5 TABLET ORAL at 21:36

## 2019-09-19 RX ADMIN — Medication 3 MILLILITER(S): at 07:29

## 2019-09-19 NOTE — PROGRESS NOTE ADULT - ASSESSMENT
This is a 73yo F with medical hx significant for HTN, Uterine CA s/p CHIRSTIE, chemotherapy, and radiation x 4 yrs ago, presented to OSH with SOB , found to have Acute submassive PE, transferred to [facility] for further management on 09/16.   Echocardiogram showing no right heart failure   Duplex LE showing left common femoral DVT   Now s/p catheter directed thrombolysis on 9/17/19, completed 24hrs of tPA, sheath are out. Back on heparin gtt   Currently respiratory status is improving, no increase in oxygen requirement    Plan:  - continue management per primary team   - Vascular will follow

## 2019-09-19 NOTE — PROGRESS NOTE ADULT - PROBLEM SELECTOR PROBLEM 4
Lower extremity edema
Lower extremity edema
Hypertension, unspecified type
Hypertension, unspecified type

## 2019-09-19 NOTE — PHYSICAL THERAPY INITIAL EVALUATION ADULT - FOLLOWS COMMANDS/ANSWERS QUESTIONS, REHAB EVAL
[address] [place] [id number] [phone] Patient: Evans Curtis MRN: [id number] Visit Information Date & Time Provider Department Dept. Phone Encounter #  
 10/12/2018  8:40 AM Marilyn Fry MD [facility] [phone] [id number] Follow-up Instructions Return in about 6 months (around 4/12/2019). Upcoming Health Maintenance Date Due Hepatitis C Screening 1952 DTaP/Tdap/Td series (1 - Tdap) 1/15/1974 Shingrix Vaccine Age 50> (1 of 2) 1/15/2003 FOBT Q 1 YEAR AGE 50-75 1/15/2003 GLAUCOMA SCREENING Q2Y 1/15/2018 Pneumococcal 65+ Low/Medium Risk (1 of 2 - PCV13) 1/15/2018 Influenza Age 5 to Adult 8/1/2018 Allergies as of 10/12/2018  Review Complete On: 4/13/2018 By: Christine Curtis LPN No Known Allergies Current Immunizations  Never Reviewed No immunizations on file. Not reviewed this visit Vitals BP Pulse Weight(growth percentile) SpO2 BMI Smoking Status 130/68 92 241 lb (109.3 kg) 98% 33.61 kg/m2 Former Smoker BMI and BSA Data Body Mass Index Body Surface Area  
 33.61 kg/m 2 2.34 m 2 Preferred Pharmacy Pharmacy Name Phone [address] [facility], [facility] Elisa Harris [phone] Your Updated Medication List  
  
   
This list is accurate as of 10/12/18  9:15 AM.  Always use your most recent med list.  
  
  
  
  
 Vitaly Doris 250-50 mcg/dose diskus inhaler Generic drug:  fluticasone-salmeterol Take 1 Puff by inhalation every twelve (12) hours. amitriptyline 10 mg tablet Commonly known as:  ELAVIL Take 10 mg by mouth daily. aspirin delayed-release 81 mg tablet Take  by mouth daily. CADUET 10-20 mg per tablet Generic drug:  amLODIPine-atorvastatin Take 1 Tab by mouth nightly. fenofibric acid 135 mg capsule Commonly known as:  TRILIPIX ER Take 135 mg by mouth nightly. finasteride 5 mg tablet Commonly known as:  PROSCAR Take 5 mg by mouth daily. FLOMAX 0.4 mg capsule Generic drug:  tamsulosin Take 0.4 mg by mouth nightly. FLONASE 50 mcg/actuation nasal spray Generic drug:  fluticasone 2 Sprays by Both Nostrils route daily as needed. gabapentin 600 mg tablet Commonly known as:  NEURONTIN Take  by mouth two (2) times a day. irbesartan 300 mg tablet Commonly known as:  AVAPRO Take 300 mg by mouth nightly. KLOR-CON 10 10 mEq tablet Generic drug:  potassium chloride SR Take 10 mEq by mouth two (2) times a day. LASIX 20 mg tablet Generic drug:  furosemide Take 20 mg by mouth daily. levocetirizine 5 mg tablet Commonly known as:  Bennett Earnestine Take 5 mg by mouth daily. NIACIN FLUSH FREE 400 mg niacin (500 mg) Cap Generic drug:  niacin-inositol Take 1 Cap by mouth nightly. predniSONE 10 mg tablet Commonly known as:  Leward Meals Take 4 Tabs by mouth daily. PROVENTIL HFA 90 mcg/actuation inhaler Generic drug:  albuterol Take 2 Puffs by inhalation every four (4) hours as needed. pyridostigmine 60 mg tablet Commonly known as:  MESTINON  
TAKE 1 TABLET BY MOUTH BEFORE MEALS , AFTER MEALS AND AT BEDTIME  
  
 SINGULAIR 10 mg tablet Generic drug:  montelukast  
Take 10 mg by mouth daily. VITAMIN D3 1,000 unit Cap Generic drug:  cholecalciferol Take 1,000 Units by mouth daily. Follow-up Instructions Return in about 6 months (around 4/12/2019). Patient Instructions PRESCRIPTION REFILL POLICY [facility] Statement to Patients April 1, 2014 In an effort to ensure the large volume of patient prescription refills is processed in the most efficient and expeditious manner, we are asking our patients to assist us by calling your Pharmacy for all prescription refills, this will include also your  Mail Order Pharmacy. The pharmacy will contact our office electronically to continue the refill process. Please do not wait until the last minute to call your pharmacy. We need at least 48 hours (2days) to fill prescriptions. We also encourage you to call your pharmacy before going to  your prescription to make sure it is ready. With regard to controlled substance prescription refill requests (narcotic refills) that need to be picked up at our office, we ask your cooperation by providing us with at least 72 hours (3days) notice that you will need a refill. We will not refill narcotic prescription refill requests after 4:00pm on any weekday, Monday through Thursday, or after 2:00pm on Fridays, or on the weekends. We encourage everyone to explore another way of getting your prescription refill request processed using [employer], our patient web portal through our electronic medical record system. [employer] is an efficient and effective way to communicate your medication request directly to the office and  downloadable as an sparkle on your smart phone . [employer] also features a review functionality that allows you to view your medication list as well as leave messages for your physician. Are you ready to get connected? If so please review the attatched instructions or speak to any of our staff to get you set up right away! Thank you so much for your cooperation. Should you have any questions please contact our Practice Administrator. The Physicians and Staff,  [facility] PRESCRIPTION REFILL POLICY [facility] Statement to Patients April 1, 2014 In an effort to ensure the large volume of patient prescription refills is processed in the most efficient and expeditious manner, we are asking our patients to assist us by calling your Pharmacy for all prescription refills, this will include also your  Mail Order Pharmacy. The pharmacy will contact our office electronically to continue the refill process. Please do not wait until the last minute to call your pharmacy. We need at least 48 hours (2days) to fill prescriptions. We also encourage you to call your pharmacy before going to  your prescription to make sure it is ready. With regard to controlled substance prescription refill requests (narcotic refills) that need to be picked up at our office, we ask your cooperation by providing us with at least 72 hours (3days) notice that you will need a refill. We will not refill narcotic prescription refill requests after 4:00pm on any weekday, Monday through Thursday, or after 2:00pm on Fridays, or on the weekends. We encourage everyone to explore another way of getting your prescription refill request processed using [employer], our patient web portal through our electronic medical record system. [employer] is an efficient and effective way to communicate your medication request directly to the office and  downloadable as an sparkle on your smart phone . [employer] also features a review functionality that allows you to view your medication list as well as leave messages for your physician. Are you ready to get connected? If so please review the attatched instructions or speak to any of our staff to get you set up right away! Thank you so much for your cooperation. Should you have any questions please contact our Practice Administrator. The Physicians and Staff,  [facility] Introducing [facility]! Dear Lazaro Stein: Thank you for requesting a [employer] account. Our records indicate that you already have an active [employer] account. You can access your account anytime at [email or web address] Did you know that you can access your hospital and ER discharge instructions at any time in [employer]? You can also review all of your test results from your hospital stay or ER visit. Additional Information If you have questions, please visit the Frequently Asked Questions section of the [employer] website at [email or web address] Remember, [employer] is NOT to be used for urgent needs. For medical emergencies, dial 911. Now available from your iPhone and Android! Please provide this summary of care documentation to your next provider. Your primary care clinician is listed as Itzel Dorantes. If you have any questions after today's visit, please call [phone]. 100% of the time

## 2019-09-19 NOTE — PROGRESS NOTE ADULT - ASSESSMENT
75yo F with PMH of HTN (not on medications) and uterine CA (s/p CHRISTIE, chemotherapy, and radiation, last chemo tx 3-4yrs ago) who was transferred to [facility] from [facility] after found to have submassive PE on imaging. Started on heparin gtt, and transferred to [facility] for medical management. Pt admitted to [facility] for telemetry monitoring and medical management of PE. Echo with LVEF 55% without R heart strain. L lower extremity with partially occlusive and occlusive thrombus involving L common femoral, femoral, and popliteal vein. Pt transferred to MICU for catheter-directed thrombectomy. Now status post thrombectomy and stable for transfer to [facility].

## 2019-09-19 NOTE — PROGRESS NOTE ADULT - PROBLEM SELECTOR PLAN 1
Submassive PE, Bilateral / Saddle, Hemodynamically stable.   Etiology: HX of malignancy with no follow up for last 4 years. Obese but independent in ADL's, former smoker, post menopausal, No recent surgery, travel.  CE negative, BNP elevated, EKG : RBBB. ECHO shows no RH dilation, PASP normal.   DVT left Lower extremity ( CFV, Femoral, Popliteal)  S/p Catheter directed lysis with TPA 09/17, femoral cathter with sheath removed 09/18.   - Currently on heparin.   - will see if she desats with ambulation. Talked to the Nurse to check for ambulatory sats once she is working with PT.   - High suspicion that etiology of VTE might be related to her hx of cancer perhaps recurrence now. Would need a follow up with heme onc.  - Consider switching to oral anticoagulant.    - continue supplemental O2 with NC to maintain O2 >94%

## 2019-09-19 NOTE — PROGRESS NOTE ADULT - PROBLEM SELECTOR PROBLEM 3
Hypertension, unspecified type
Hypertension, unspecified type
Uterine cancer
Uterine cancer
Lower extremity edema
Lower extremity edema
Uterine cancer

## 2019-09-19 NOTE — PHYSICAL THERAPY INITIAL EVALUATION ADULT - ADDITIONAL COMMENTS
Pt reports her PLOF includes using cane to ambulate, lives with family, and does not require to navigate stairs to access her home.

## 2019-09-19 NOTE — PROGRESS NOTE ADULT - ASSESSMENT
This is a 75yo F with medical hx significant for HTN, Uterine CA s/p CHRISTIE, chemotherapy, and radiation x 4 yrs ago, presented to OSH with SOB , found to have Acute submassive PE, transferred to [facility] for further management on 09/16, s/p catheter directed TPA.

## 2019-09-19 NOTE — PROGRESS NOTE ADULT - SUBJECTIVE AND OBJECTIVE BOX
OVERNIGHT EVENTS:  CHIOMA overnights.      SUBJECTIVE/INTERVAL HPI:  Saw and examined patient at bedside this AM. Patient reports no new complaints. No CP, SOB, abdominal pain, dysuria.    VITAL SIGNS:  Vital Signs Last 24 Hrs  T(C): 36.7 (19 Sep 2019 13:15), Max: 37.3 (18 Sep 2019 21:47)  T(F): 98 (19 Sep 2019 13:15), Max: 99.1 (18 Sep 2019 21:47)  HR: 78 (19 Sep 2019 11:58) (70 - 84)  BP: 166/74 (19 Sep 2019 11:58) (139/63 - 168/84)  BP(mean): 106 (19 Sep 2019 11:58) (91 - 117)  RR: 16 (19 Sep 2019 11:58) (16 - 29)  SpO2: 94% (19 Sep 2019 11:58) (92% - 98%)    PHYSICAL EXAM:    General: in NAD, lying comfortably in bed  HEENT: normocephalic, atraumatic; PERRL, anicteric sclera; MMM  Neck: supple, no JVD  Cardiovascular: +S1/S2, RRR, no M/G/R  Respiratory: diffuse wheezing in all posterior fields; not using accessory muscles to breath  Gastrointestinal: soft, obese NT/ND; +BSx4  : b/l inguinal groin sites with gauze, clean, dry, and intact; no erythema  Extremities: WWP; no edema, clubbing or cyanosis  Vascular: 2+ radial, DP pulses B/L  Neurological: AAOx3    MEDICATIONS:  MEDICATIONS  (STANDING):  heparin  Infusion 2400 Unit(s)/Hr (21 mL/Hr) IV Continuous <Continuous>  influenza   Vaccine 0.5 milliLiter(s) IntraMuscular once  lisinopril 5 milliGRAM(s) Oral at bedtime    MEDICATIONS  (PRN):  calamine Lotion 1 Application(s) Topical three times a day PRN Rash and/or Itching      ALLERGIES:  Allergies    No Known Allergies    Intolerances        LABS:                        12.6   7.63  )-----------( 149      ( 19 Sep 2019 07:27 )             40.4     09-19    138  |  100  |  9   ----------------------------<  123<H>  4.4   |  29  |  0.89    Ca    8.9      19 Sep 2019 07:27  Phos  3.7     09-18  Mg     1.9     09-19    TPro  7.0  /  Alb  3.4  /  TBili  0.7  /  DBili  x   /  AST  11  /  ALT  10  /  AlkPhos  94  09-19    PT/INR - ( 19 Sep 2019 07:27 )   PT: 13.3 sec;   INR: 1.17          PTT - ( 19 Sep 2019 12:45 )  PTT:66.4 sec    CAPILLARY BLOOD GLUCOSE          RADIOLOGY & ADDITIONAL TESTS: Reviewed. HOSPITAL COURSE  Mrs. Escobar is a 74-year-old female with a past medical history of HTN not on medications, uterine cancer status post chemoradiation and CHRISTIE 4 years ago, and two SBO episodes who was transferred from [facility] and being found to have a submassive saddle pulmonary embolism.  TTE while inpatient: EF 55% without right heart dilation. She had a lower extremity doppler significant for left lower extremity partially occlusive and occlusive thrombi of the left common femoral, femoral, and popliteal veins. Initially admitted to [facility] but after becoming hypoxic she was transferred to the MICU for catheter directed thrombolysis. She is now status post embolism lysis, sheath has been removed, she has remained hemodynamically stable, and was stepped down back to 7LA.    OVERNIGHT EVENTS:  CHIOMA overnights.      SUBJECTIVE/INTERVAL HPI:  Saw and examined patient at bedside this AM. Patient reports no new complaints. No CP, SOB, abdominal pain, dysuria.    VITAL SIGNS:  Vital Signs Last 24 Hrs  T(C): 36.7 (19 Sep 2019 13:15), Max: 37.3 (18 Sep 2019 21:47)  T(F): 98 (19 Sep 2019 13:15), Max: 99.1 (18 Sep 2019 21:47)  HR: 78 (19 Sep 2019 11:58) (70 - 84)  BP: 166/74 (19 Sep 2019 11:58) (139/63 - 168/84)  BP(mean): 106 (19 Sep 2019 11:58) (91 - 117)  RR: 16 (19 Sep 2019 11:58) (16 - 29)  SpO2: 94% (19 Sep 2019 11:58) (92% - 98%)    PHYSICAL EXAM:    General: in NAD, lying comfortably in bed  HEENT: normocephalic, atraumatic; PERRL, anicteric sclera; MMM  Neck: supple, no JVD  Cardiovascular: +S1/S2, RRR, no M/G/R  Respiratory: diffuse wheezing in all posterior fields; not using accessory muscles to breath  Gastrointestinal: soft, obese NT/ND; +BSx4  : b/l inguinal groin sites with gauze, clean, dry, and intact; no erythema  Extremities: WWP; no edema, clubbing or cyanosis  Vascular: 2+ radial, DP pulses B/L  Neurological: AAOx3    MEDICATIONS:  MEDICATIONS  (STANDING):  heparin  Infusion 2400 Unit(s)/Hr (21 mL/Hr) IV Continuous <Continuous>  influenza   Vaccine 0.5 milliLiter(s) IntraMuscular once  lisinopril 5 milliGRAM(s) Oral at bedtime    MEDICATIONS  (PRN):  calamine Lotion 1 Application(s) Topical three times a day PRN Rash and/or Itching      ALLERGIES:  Allergies    No Known Allergies    Intolerances        LABS:                        12.6   7.63  )-----------( 149      ( 19 Sep 2019 07:27 )             40.4     09-19    138  |  100  |  9   ----------------------------<  123<H>  4.4   |  29  |  0.89    Ca    8.9      19 Sep 2019 07:27  Phos  3.7     09-18  Mg     1.9     09-19    TPro  7.0  /  Alb  3.4  /  TBili  0.7  /  DBili  x   /  AST  11  /  ALT  10  /  AlkPhos  94  09-19    PT/INR - ( 19 Sep 2019 07:27 )   PT: 13.3 sec;   INR: 1.17          PTT - ( 19 Sep 2019 12:45 )  PTT:66.4 sec    CAPILLARY BLOOD GLUCOSE          RADIOLOGY & ADDITIONAL TESTS: Reviewed.

## 2019-09-19 NOTE — PROGRESS NOTE ADULT - PROBLEM SELECTOR PLAN 1
CT at [facility] demonstrating acute saddle pulmonary embolism with signs of right heart strain and early pulmonary infarct. TTE on 9/15 EF 55%, no right heart strain, normal RA and RV, normal PASP. Found to have L lower extremity partially occlusive and occlusive thrombus of the L common femoral, femoral, and popliteal vein. Now status post catheter directed thrombectomy on 9/17.  -tPA stopped at 1:30PM on 9/18 - monitoring PT, PTT, fibrinogen, platelets Q4H.  -PA pressures Q8H  -Monitor for HD instability  -Supplemental O2 as needed  -Eventual transition to oral anticoagulant with Eliquis  -Follow up pulm recs  - f/u PT evaluation

## 2019-09-19 NOTE — DISCHARGE NOTE PROVIDER - NSDCFUADDAPPT_GEN_ALL_CORE_FT
Please schedule an appointment to follow-up with your primary care physician within 2 weeks after discharge from the hospital. Please follow-up with Dr. Galvin when you are discharged. Someone from his office will call you to provide you with the date and time of your follow up appointment. You can call his office at  if you do not receive a phone call.

## 2019-09-19 NOTE — PROGRESS NOTE ADULT - PROBLEM SELECTOR PLAN 4
Worsening lower extremity edema since undergoing chemoradiation in 2015.    -L>R lower extremity edema with erythema and warmth.  -2+ pitting edema  -Left lower extremity with partially occlusive and occlusive thrombus involving the left common femoral, femoral, and popliteal vein.
Worsening lower extremity edema since undergoing chemoradiation in 2015.  -L>R lower extremity edema with erythema and warmth on admission  -Left lower extremity with partially occlusive and occlusive thrombus involving the left common femoral, femoral, and popliteal vein on U/S  - erythema and warmth improved, but still with b/l 2+ pitting edema today
Pt with reported Hx of HTN, states she used to take medication, but stopped 2-3 years ago when she stopped seeing a doctor regularly. BP on arrival to [facility] 183/90.  - Monitor BP for now  - Would consider starting anti-hypertensive if HTN persists
Pt with reported Hx of HTN, states she used to take medication, but stopped 2-3 years ago when she stopped seeing a doctor regularly. BP on arrival to [facility] 183/90.  - Monitor BP for now  - Would consider starting anti-hypertensive if HTN persists

## 2019-09-19 NOTE — PROGRESS NOTE ADULT - SUBJECTIVE AND OBJECTIVE BOX
PULMONARY CONSULT FOLLOW-UP NOTE    INTERVAL HISTORY:   Reviewed chart and overnight events; patient seen and examined at bedside.  Transferred to Stepdown.  No overnight events.   Asymptomatic at rest  on 2 L NC, i took it off satting 96% while sitting.  Will ambulate once she finishes her breakfast.     MEDICATIONS:  Pulmonary:    Antimicrobials:    Anticoagulants:  heparin  Infusion 2400 Unit(s)/Hr IV Continuous <Continuous>    Cardiac:  lisinopril 5 milliGRAM(s) Oral at bedtime      Allergies    No Known Allergies    Intolerances        Vital Signs Last 24 Hrs  T(C): 36.7 (19 Sep 2019 05:12), Max: 37.3 (18 Sep 2019 21:47)  T(F): 98 (19 Sep 2019 05:12), Max: 99.1 (18 Sep 2019 21:47)  HR: 80 (19 Sep 2019 08:40) (70 - 84)  BP: 167/67 (19 Sep 2019 08:40) (139/63 - 168/84)  BP(mean): 96 (19 Sep 2019 08:40) (91 - 117)  RR: 20 (19 Sep 2019 08:40) (17 - 29)  SpO2: 94% (19 Sep 2019 08:40) (92% - 98%)    09-18 @ 07:01 - 09-19 @ 07:00  --------------------------------------------------------  IN: 329 mL / OUT: 2400 mL / NET: -2071 mL    09-19 @ 07:01 - 09-19 @ 09:50  --------------------------------------------------------  IN: 48 mL / OUT: 0 mL / NET: 48 mL          PHYSICAL EXAM:  Constitutional: oriented, not in distress.   HEENT: NC/AT; PERRL, anicteric sclera; MMM  Neck: supple  Cardiovascular: +S1/S2, RRR  Respiratory: CTA B/L; no W/R/R  Gastrointestinal: soft, NT/ND; +BSx4  Extremities:  pitting edema +, femoral sites show no hematoma, slight tenderness on right.   Vascular: 2+ radial, DP/PT pulses B/L  Neurological: AAOx3; no focal deficits    LABS:      CBC Full  -  ( 19 Sep 2019 07:27 )  WBC Count : 7.63 K/uL  RBC Count : 4.63 M/uL  Hemoglobin : 12.6 g/dL  Hematocrit : 40.4 %  Platelet Count - Automated : 149 K/uL  Mean Cell Volume : 87.3 fl  Mean Cell Hemoglobin : 27.2 pg  Mean Cell Hemoglobin Concentration : 31.2 gm/dL  Auto Neutrophil # : x  Auto Lymphocyte # : x  Auto Monocyte # : x  Auto Eosinophil # : x  Auto Basophil # : x  Auto Neutrophil % : x  Auto Lymphocyte % : x  Auto Monocyte % : x  Auto Eosinophil % : x  Auto Basophil % : x    09-19    138  |  100  |  9   ----------------------------<  123<H>  4.4   |  29  |  0.89    Ca    8.9      19 Sep 2019 07:27  Phos  3.7     09-18  Mg     1.9     09-19    TPro  7.0  /  Alb  3.4  /  TBili  0.7  /  DBili  x   /  AST  11  /  ALT  10  /  AlkPhos  94  09-19    PT/INR - ( 19 Sep 2019 07:27 )   PT: 13.3 sec;   INR: 1.17          PTT - ( 19 Sep 2019 07:27 )  PTT:100.3 sec

## 2019-09-19 NOTE — PROGRESS NOTE ADULT - PROBLEM SELECTOR PROBLEM 5
Malignant neoplasm of uterus, unspecified site

## 2019-09-19 NOTE — DISCHARGE NOTE PROVIDER - HOSPITAL COURSE
Patient is 75 yo F with past medical history of HTN, uterine CA (s/p CHRISTIE, chemo and radiation), SBOx2    Presented with 1 week history of shortness of breath and fatigue with stabbing L shoulder pain, found to have submassive pulmonary embolism    Problem List/Main Diagnoses (system-based):         #Pulmonary embolism - Pt continued on heparin drip here at [facility] after transfer from [facility] (where CTA showed large saddle embolus with signs of R heart strain). Echocardiogram showed LVEF of 55% without R heart dysfunction. Received catheter-directed lysis of saddle pulmonary embolus on 9/17 in the MICU, discontinued on 9/18. Heparin gtt restarted on 9/19. Transitioned to Eliquis    #DVT - Doppler ultrasound of lower legs showed DVT seen in L common femoral vein. No DVTs in R lower extremity, bilateral upper extremities.    #HTN - Hx of HTN without any home BP meds. Started on lisinopril 5mg po qhs here         Inpatient treatment course: Started on heparin drip here for pulmonary embolism. Received catheter-directed lysis of saddle pulmonary embolus on 9/17 in the MICU, discontinued on 9/18    New medications: Lisinopril 5mg po qhs, Eliquis    Labs to be followed outpatient:     Exam to be followed outpatient: Patient is 73 yo F with past medical history of HTN, uterine CA (s/p CHRISTIE, chemo and radiation), SBOx2    Presented with 1 week history of shortness of breath and fatigue with stabbing L shoulder pain, found to have submassive pulmonary embolism    Problem List/Main Diagnoses (system-based):         #Pulmonary embolism - Pt continued on heparin drip here at [facility] after transfer from [facility] (where CTA showed large saddle embolus with signs of R heart strain). Echocardiogram showed LVEF of 55% without R heart dysfunction. Received catheter-directed lysis of saddle pulmonary embolus on 9/17 in the MICU, discontinued on 9/18. Heparin gtt restarted on 9/19. Transitioned to Eliquis    #DVT - Doppler ultrasound of lower legs showed DVT seen in L common femoral vein. No DVTs in R lower extremity, bilateral upper extremities.    #HTN - Hx of HTN without any home BP meds. Started on lisinopril 5mg po qhs here         Inpatient treatment course: Started on heparin drip here for pulmonary embolism. Received catheter-directed lysis of saddle pulmonary embolus on 9/17 in the MICU, discontinued on 9/18    New medications: Lisinopril 5mg po qhs, Eliquis 10mg po BID for 1 week and then Eliquis 5mg po BID    Labs to be followed outpatient:     Exam to be followed outpatient: Patient is 73 yo F with past medical history of HTN, uterine CA (s/p CHRISTIE, chemo and radiation), SBOx2    Presented with 1 week history of shortness of breath and fatigue with stabbing L shoulder pain, found to have submassive pulmonary embolism    Problem List/Main Diagnoses (system-based):         #Pulmonary embolism - Pt continued on heparin drip here at [facility] after transfer from [facility] (where CTA showed large saddle embolus with signs of R heart strain). Echocardiogram showed LVEF of 55% without R heart dysfunction. Received catheter-directed lysis of saddle pulmonary embolus on 9/17 in the MICU, discontinued on 9/18. Heparin gtt restarted on 9/19. Transitioned to Eliquis 10mg BID for 1 week and then Eliquis 5mg BID.    #DVT - Doppler ultrasound of lower legs showed DVT seen in L common femoral vein. No DVTs in R lower extremity, bilateral upper extremities.    #HTN - Hx of HTN without any home BP meds. Started on lisinopril 5mg po qhs here         Inpatient treatment course: Started on heparin drip here for pulmonary embolism. Received catheter-directed lysis of saddle pulmonary embolus on 9/17 in the MICU, discontinued on 9/18    New medications: Lisinopril 5mg po qhs, Eliquis 10mg po BID for 1 week and then Eliquis 5mg po BID    Labs to be followed outpatient:     Exam to be followed outpatient:

## 2019-09-19 NOTE — PHYSICAL THERAPY INITIAL EVALUATION ADULT - GAIT DEVIATIONS NOTED, PT EVAL
decreased stride length/decreased weight-shifting ability/dec toe clearance, dec endurance/decreased aubrey

## 2019-09-19 NOTE — DISCHARGE NOTE PROVIDER - NSDCCAREPROVSEEN_GEN_ALL_CORE_FT
Maria De Jesus Galvin Maria De Jesus Galvin A  Patient seen and examined with house-staff during bedside rounds.  Resident note read, including vitals, physical findings, laboratory data, and radiological reports.   Revisions included below.  Direct personal management at bed side and extensive interpretation of the data.  Plan was outlined and discussed in details with the housestaff.  Decision making of high complexity  Action taken for acute disease activity to reflect the level of care provided:  - medication reconciliation  - review laboratory data  continue on anticoagulation  follow on CT scan results  increase activity  avoid falls  Follow in 4 weeks

## 2019-09-19 NOTE — PROGRESS NOTE ADULT - SUBJECTIVE AND OBJECTIVE BOX
SUBJECTIVE:   Patient doing well, she is out of the ICU   She is confortable on 2LNC   She had some episode of desaturation overnight but come back up when in more upright position   No other acute event per RN     MEDICATIONS  (STANDING):  heparin  Infusion 2400 Unit(s)/Hr (24 mL/Hr) IV Continuous <Continuous>  influenza   Vaccine 0.5 milliLiter(s) IntraMuscular once  lisinopril 5 milliGRAM(s) Oral at bedtime    MEDICATIONS  (PRN):  calamine Lotion 1 Application(s) Topical three times a day PRN Rash and/or Itching    	    Vital Signs Last 24 Hrs  T(C): 36.1 (17 Sep 2019 14:41), Max: 36.7 (17 Sep 2019 10:08)  T(F): 97 (17 Sep 2019 14:41), Max: 98.1 (17 Sep 2019 10:08)  HR: 74 (17 Sep 2019 15:00) (68 - 86)  BP: 154/58 (17 Sep 2019 12:41) (140/75 - 170/99)  BP(mean): 100 (17 Sep 2019 12:41) (94 - 128)  RR: 23 (17 Sep 2019 15:00) (17 - 29)  SpO2: 96% (17 Sep 2019 15:00) (92% - 98%)  I&O's Detail    16 Sep 2019 07:01  -  17 Sep 2019 07:00  --------------------------------------------------------  IN:    heparin  Infusion.: 240 mL    heparin Infusion: 54 mL  Total IN: 294 mL    OUT:  Total OUT: 0 mL    Total NET: 294 mL      17 Sep 2019 07:01  -  17 Sep 2019 15:56  --------------------------------------------------------  IN:    alteplase    Infusion.: 40 mL    alteplase    Infusion.: 40 mL    heparin Infusion: 5 mL    heparin Infusion: 15 mL  Total IN: 100 mL    OUT:  Total OUT: 0 mL    Total NET: 100 mL      PHYSICAL EXAM:   Neuro: awake and alert, no focal deficit   HEENT: normocephalic, no scleral ictera   Pulm: non labored breathing on 2L NC, lungs are clear to auscultation   CV: regular rate and rhythm, no murmur to ausculation, no carotid bruit   GI: abdomen is soft non tender to palpation, no hepatomegaly    MSK: Bilateral lower extremity swelling   Skin: no rash, no wound, no phlegmasia    Psych: cooperative, appropriate mood and affect       LABS:                        12.6   7.33  )-----------( 189      ( 17 Sep 2019 05:49 )             40.1     09-17    137  |  103  |  13  ----------------------------<  140<H>  3.8   |  24  |  0.93    Ca    8.7      17 Sep 2019 05:49  Phos  3.8     09-17  Mg     2.0     09-17    TPro  6.8  /  Alb  3.3  /  TBili  1.0  /  DBili  x   /  AST  14  /  ALT  13  /  AlkPhos  95  09-16    PT/INR - ( 17 Sep 2019 13:09 )   PT: 13.4 sec;   INR: 1.18          PTT - ( 17 Sep 2019 13:09 )  PTT:40.8 sec      RADIOLOGY & ADDITIONAL STUDIES:

## 2019-09-19 NOTE — DISCHARGE NOTE PROVIDER - CARE PROVIDER_API CALL
Maria De Jesus Galvin)  Critical Care Medicine; Pulmonary Disease  [address] [place] [address]  Phone: [phone]  Fax: [phone]  Follow Up Time: 2 weeks Maria De Jesus Galvin)  Critical Care Medicine; Pulmonary Disease  [address] [place] [address]  Phone: [phone]  Fax: [phone]  Follow Up Time: 2 weeks

## 2019-09-19 NOTE — PROGRESS NOTE ADULT - PROBLEM SELECTOR PLAN 6
F: None  E: Replete as needed  N: DASH/TLC  Ppx: None  Code: Full  Dispo: 7LA
F: none  E: Replete as needed  N: DASH/TLC  Ppx: hep gtt  Code: Full  Dispo: 7LA
F: Not indicated at this time  E: Replete PRN; K>4, Mg>2  N: NPO pending possible intervention    FULL CODE    DVT PPx: Heparin gtt
F: Not indicated at this time  E: Replete PRN; K>4, Mg>2  N: NPO pending possible intervention    FULL CODE    DVT PPx: Heparin gtt  Dispo: MICU

## 2019-09-19 NOTE — PROGRESS NOTE ADULT - PROBLEM SELECTOR PLAN 5
History of uterine cancer status post chemoradiation and hysterectomy 4 years ago. Not currently following with an oncologist. History of uterine cancer status post chemoradiation and hysterectomy 4 years ago. Not currently following with an oncologist.  - given recent DVT and PE, pt should f/u heme/onc for possible recurrence

## 2019-09-19 NOTE — PHYSICAL THERAPY INITIAL EVALUATION ADULT - PERTINENT HX OF CURRENT PROBLEM, REHAB EVAL
73yo F with PMH of HTN (not on medications) and uterine CA (s/p CHRISTIE, chemotherapy, and radiation, last chemo tx 3-4yrs ago), and surgical hx of cholecystectomy, SBOx2, and CHRISTIE), who was transferred to [facility] from [facility] after found to have submassive PE on imaging.

## 2019-09-19 NOTE — DISCHARGE NOTE PROVIDER - NSDCCPCAREPLAN_GEN_ALL_CORE_FT
PRINCIPAL DISCHARGE DIAGNOSIS  Diagnosis: Pulmonary embolus  Assessment and Plan of Treatment: You were found to have a clot in your lungs, also called a pulmonary embolism.  Please follow-up with your primary care physician after discharge from this hospital in 2 weeks.      SECONDARY DISCHARGE DIAGNOSES  Diagnosis: Deep vein thrombosis  Assessment and Plan of Treatment: Ultrasound imaging of your legs found a clot in your left leg invovling the left common femoral vein.  Please follow-up with your primary care physician after discharge from this hospital in 2 weeks.    Diagnosis: Hypertension  Assessment and Plan of Treatment: You have a known history of high blood pressure prior to your admission. You were started on lisinopril 5mg orally once a day at bedtime.  Please continue taking lisinopril 5mg orally once a day at bedtime.  Please follow-up with your primary care physician after discharge from this hospital in 2 weeks.  High blood pressure can cause damage to your heart and kidneys and increases your risk of heart attack and stroke. To avoid this, It is important that you continue to take this medication when you are discharged so that you can continue to control your blood pressure. Additionally be sure to follow up with your primary care physician on a regular basis to make sure your blood pressure continues to be well controlled. If you experience symptoms such as but not limited to: sudden onset blurry vision, nausea, vomiting, chest pain, shortness of breath, or palpitations, please go to the nearest emergency room. PRINCIPAL DISCHARGE DIAGNOSIS  Diagnosis: Pulmonary embolus  Assessment and Plan of Treatment: You were found to have a clot in your lungs, also called a pulmonary embolism.  Please continue taking Eliquis 10mg orally twice a day for 7 days, and then start taking Eliquis 5mg oraly twice a day.  Please follow-up with your primary care physician after discharge from this hospital in 2 weeks.      SECONDARY DISCHARGE DIAGNOSES  Diagnosis: Deep vein thrombosis  Assessment and Plan of Treatment: Ultrasound imaging of your legs found a clot in your left leg invovling the left common femoral vein.  Please follow-up with your primary care physician after discharge from this hospital in 2 weeks.    Diagnosis: Hypertension  Assessment and Plan of Treatment: You have a known history of high blood pressure prior to your admission. You were started on lisinopril 5mg orally once a day at bedtime.  Please continue taking lisinopril 5mg orally once a day at bedtime.  Please follow-up with your primary care physician after discharge from this hospital in 2 weeks.  High blood pressure can cause damage to your heart and kidneys and increases your risk of heart attack and stroke. To avoid this, It is important that you continue to take this medication when you are discharged so that you can continue to control your blood pressure. Additionally be sure to follow up with your primary care physician on a regular basis to make sure your blood pressure continues to be well controlled. If you experience symptoms such as but not limited to: sudden onset blurry vision, nausea, vomiting, chest pain, shortness of breath, or palpitations, please go to the nearest emergency room. PRINCIPAL DISCHARGE DIAGNOSIS  Diagnosis: Pulmonary embolus  Assessment and Plan of Treatment: You were found to have a clot in your lungs, also called a pulmonary embolism.  Please continue taking Eliquis 10mg orally twice a day for 6 more days, and then start taking Eliquis 5mg oraly twice a day.  Please follow-up with Dr. Maria De Jesus Galvin's office 1 month from discharge from the hospital.  Maria De Jesus Galvin (MD)  Critical Care Medicine; Pulmonary Disease  [address], [address] [place] [address]  Phone: [phone]  Please follow-up with your primary care physician after discharge from this hospital in 2 weeks.      SECONDARY DISCHARGE DIAGNOSES  Diagnosis: Deep vein thrombosis  Assessment and Plan of Treatment: Ultrasound imaging of your legs found a clot in your left leg invovling the left common femoral vein.  Please follow-up with your primary care physician after discharge from this hospital in 2 weeks.    Diagnosis: Hypertension  Assessment and Plan of Treatment: You have a known history of high blood pressure prior to your admission. You were started on lisinopril 5mg orally once a day at bedtime.  Please continue taking lisinopril 5mg orally once a day at bedtime.  Please follow-up with your primary care physician after discharge from this hospital in 2 weeks.  High blood pressure can cause damage to your heart and kidneys and increases your risk of heart attack and stroke. To avoid this, It is important that you continue to take this medication when you are discharged so that you can continue to control your blood pressure. Additionally be sure to follow up with your primary care physician on a regular basis to make sure your blood pressure continues to be well controlled. If you experience symptoms such as but not limited to: sudden onset blurry vision, nausea, vomiting, chest pain, shortness of breath, or palpitations, please go to the nearest emergency room.

## 2019-09-20 ENCOUNTER — TRANSCRIPTION ENCOUNTER (OUTPATIENT)
Age: 74
End: 2019-09-20

## 2019-09-20 VITALS
OXYGEN SATURATION: 92 % | HEART RATE: 101 BPM | SYSTOLIC BLOOD PRESSURE: 113 MMHG | RESPIRATION RATE: 16 BRPM | DIASTOLIC BLOOD PRESSURE: 70 MMHG

## 2019-09-20 PROCEDURE — 74177 CT ABD & PELVIS W/CONTRAST: CPT | Mod: 26

## 2019-09-20 PROCEDURE — 93010 ELECTROCARDIOGRAM REPORT: CPT

## 2019-09-20 PROCEDURE — 99239 HOSP IP/OBS DSCHRG MGMT >30: CPT | Mod: GC

## 2019-09-20 RX ORDER — APIXABAN 2.5 MG/1
1 TABLET, FILM COATED ORAL
Qty: 60 | Refills: 0
Start: 2019-09-20 | End: 2019-10-19

## 2019-09-20 RX ORDER — APIXABAN 2.5 MG/1
2 TABLET, FILM COATED ORAL
Qty: 24 | Refills: 0
Start: 2019-09-20 | End: 2019-09-25

## 2019-09-20 RX ORDER — ACETAMINOPHEN 500 MG
650 TABLET ORAL ONCE
Refills: 0 | Status: COMPLETED | OUTPATIENT
Start: 2019-09-20 | End: 2019-09-20

## 2019-09-20 RX ADMIN — Medication 650 MILLIGRAM(S): at 16:30

## 2019-09-20 RX ADMIN — Medication 650 MILLIGRAM(S): at 15:19

## 2019-09-20 RX ADMIN — APIXABAN 10 MILLIGRAM(S): 2.5 TABLET, FILM COATED ORAL at 05:08

## 2019-09-20 RX ADMIN — APIXABAN 10 MILLIGRAM(S): 2.5 TABLET, FILM COATED ORAL at 17:29

## 2019-09-20 NOTE — PROGRESS NOTE ADULT - PROBLEM SELECTOR PROBLEM 1
Acute saddle pulmonary embolism without acute cor pulmonale
Acute saddle pulmonary embolism without acute cor pulmonale
Pulmonary thromboembolism
Acute saddle pulmonary embolism without acute cor pulmonale
Acute saddle pulmonary embolism without acute cor pulmonale
Pulmonary thromboembolism

## 2019-09-20 NOTE — PROGRESS NOTE ADULT - ASSESSMENT
This is a 73yo F with medical hx significant for HTN, Uterine CA s/p CHRISTIE, chemotherapy, and radiation x 4 yrs ago, presented to OSH with SOB , found to have Acute submassive PE, transferred to [facility] for further management on 09/16, s/p catheter directed TPA.

## 2019-09-20 NOTE — PROGRESS NOTE ADULT - SUBJECTIVE AND OBJECTIVE BOX
PULMONARY CONSULT FOLLOW-UP NOTE    INTERVAL HISTORY:   Reviewed chart and overnight events; patient seen and examined at bedside.  No overnight events. no groin pain.   No SOB , CP.   walked with PT yesterday : spo2 94%    MEDICATIONS  (STANDING):  apixaban 10 milliGRAM(s) Oral every 12 hours  influenza   Vaccine 0.5 milliLiter(s) IntraMuscular once  lisinopril 5 milliGRAM(s) Oral at bedtime    MEDICATIONS  (PRN):  benzonatate 100 milliGRAM(s) Oral every 8 hours PRN Cough  calamine Lotion 1 Application(s) Topical three times a day PRN Rash       Allergies    No Known Allergies    Intolerances      Vital Signs Last 24 Hrs  T(C): 36.3 (20 Sep 2019 09:30), Max: 37.1 (19 Sep 2019 21:04)  T(F): 97.3 (20 Sep 2019 09:30), Max: 98.7 (19 Sep 2019 21:04)  HR: 72 (20 Sep 2019 08:55) (72 - 94)  BP: 111/54 (20 Sep 2019 08:55) (111/54 - 166/74)  BP(mean): 78 (20 Sep 2019 08:55) (78 - 106)  RR: 18 (20 Sep 2019 08:55) (16 - 24)  SpO2: 94% (20 Sep 2019 08:55) (89% - 94%)    PHYSICAL EXAM:  Constitutional: oriented, not in distress.   HEENT: NC/AT; PERRL, anicteric sclera; MMM  Neck: supple  Cardiovascular: +S1/S2, RRR  Respiratory: CTA B/L; no W/R/R  Gastrointestinal: soft, NT/ND; +BSx4  Extremities:  pitting edema +, femoral sites show no hematoma, slight tenderness on right.   Vascular: 2+ radial, DP/PT pulses B/L  Neurological: AAOx3; no focal deficits    labs:                        12.6   7.63  )-----------( 149      ( 19 Sep 2019 07:27 )             40.4     09-19    138  |  100  |  9   ----------------------------<  123<H>  4.4   |  29  |  0.89    Ca    8.9      19 Sep 2019 07:27  Mg     1.9     09-19    TPro  7.0  /  Alb  3.4  /  TBili  0.7  /  DBili  x   /  AST  11  /  ALT  10  /  AlkPhos  94  09-19

## 2019-09-20 NOTE — DISCHARGE NOTE NURSING/CASE MANAGEMENT/SOCIAL WORK - NSDCFUADDAPPT_GEN_ALL_CORE_FT
Please follow-up with Dr. Galvin when you are discharged. Someone from his office will call you to provide you with the date and time of your follow up appointment. You can call his office at  if you do not receive a phone call.

## 2019-09-20 NOTE — PROGRESS NOTE ADULT - SUBJECTIVE AND OBJECTIVE BOX
SUBJECTIVE:   Patient doing well, breathing confortably on room air       MEDICATIONS  (STANDING):  heparin  Infusion 2400 Unit(s)/Hr (24 mL/Hr) IV Continuous <Continuous>  influenza   Vaccine 0.5 milliLiter(s) IntraMuscular once  lisinopril 5 milliGRAM(s) Oral at bedtime    MEDICATIONS  (PRN):  calamine Lotion 1 Application(s) Topical three times a day PRN Rash and/or Itching    	    Vital Signs Last 24 Hrs  T(C): 36.1 (17 Sep 2019 14:41), Max: 36.7 (17 Sep 2019 10:08)  T(F): 97 (17 Sep 2019 14:41), Max: 98.1 (17 Sep 2019 10:08)  HR: 74 (17 Sep 2019 15:00) (68 - 86)  BP: 154/58 (17 Sep 2019 12:41) (140/75 - 170/99)  BP(mean): 100 (17 Sep 2019 12:41) (94 - 128)  RR: 23 (17 Sep 2019 15:00) (17 - 29)  SpO2: 96% (17 Sep 2019 15:00) (92% - 98%)  I&O's Detail    16 Sep 2019 07:01  -  17 Sep 2019 07:00  --------------------------------------------------------  IN:    heparin  Infusion.: 240 mL    heparin Infusion: 54 mL  Total IN: 294 mL    OUT:  Total OUT: 0 mL    Total NET: 294 mL      17 Sep 2019 07:01  -  17 Sep 2019 15:56  --------------------------------------------------------  IN:    alteplase    Infusion.: 40 mL    alteplase    Infusion.: 40 mL    heparin Infusion: 5 mL    heparin Infusion: 15 mL  Total IN: 100 mL    OUT:  Total OUT: 0 mL    Total NET: 100 mL      PHYSICAL EXAM:   Neuro: awake and alert, no focal deficit   HEENT: normocephalic, no scleral ictera   Pulm: non labored breathing on 2L NC, lungs are clear to auscultation   CV: regular rate and rhythm, no murmur to ausculation, no carotid bruit   GI: abdomen is soft non tender to palpation, no hepatomegaly    MSK: Bilateral lower extremity swelling   Skin: no rash, no wound, no phlegmasia    Psych: cooperative, appropriate mood and affect       LABS:                        12.6   7.33  )-----------( 189      ( 17 Sep 2019 05:49 )             40.1     09-17    137  |  103  |  13  ----------------------------<  140<H>  3.8   |  24  |  0.93    Ca    8.7      17 Sep 2019 05:49  Phos  3.8     09-17  Mg     2.0     09-17    TPro  6.8  /  Alb  3.3  /  TBili  1.0  /  DBili  x   /  AST  14  /  ALT  13  /  AlkPhos  95  09-16    PT/INR - ( 17 Sep 2019 13:09 )   PT: 13.4 sec;   INR: 1.18          PTT - ( 17 Sep 2019 13:09 )  PTT:40.8 sec      RADIOLOGY & ADDITIONAL STUDIES:

## 2019-09-20 NOTE — PROGRESS NOTE ADULT - ATTENDING COMMENTS
Patient seen and examined with house-staff during bedside rounds.  Resident note read, including vitals, physical findings, laboratory data, and radiological reports.   Revisions included below.  Direct personal management at bed side and extensive interpretation of the data.  Plan was outlined and discussed in details with the housestaff.  Decision making of high complexity  Action taken for acute disease activity to reflect the level of care provided:  - medication reconciliation  - review laboratory data
Patient seen and examined with house-staff during bedside rounds.  Resident note read, including vitals, physical findings, laboratory data, and radiological reports.   Revisions included below.  Direct personal management at bed side and extensive interpretation of the data.  Plan was outlined and discussed in details with the housestaff.  Decision making of high complexity  Action taken for acute disease activity to reflect the level of care provided:  - medication reconciliation  - review laboratory data  ambulated patient and desaturated with few steps  discussed with vascular and proceeded with CDI  did well and PA pressures were elevated  on tpA and will follow PERT protocol  seen several times
Patient seen and examined with house-staff during bedside rounds.  Resident note read, including vitals, physical findings, laboratory data, and radiological reports.   Revisions included below.  Direct personal management at bed side and extensive interpretation of the data.  Plan was outlined and discussed in details with the housestaff.  Decision making of high complexity  Action taken for acute disease activity to reflect the level of care provided:  - medication reconciliation  - review laboratory data  change to PO apixaban  -CT scan of abdomen and pelvis  evaluated by PT and recommended home PT and no need for oxygen  discussed our pert protocol with patient and daughter
Patient seen and examined with house-staff during bedside rounds.  Resident note read, including vitals, physical findings, laboratory data, and radiological reports.   Revisions included below.  Direct personal management at bed side and extensive interpretation of the data.  Plan was outlined and discussed in details with the housestaff.  Decision making of high complexity  Action taken for acute disease activity to reflect the level of care provided:  - medication reconciliation  - review laboratory data  seen several times  - recorded PA pressures from both limbs  - continue on tPA for total 24 hours  - No hematoma at site of the chath  - restarted on heparin  -await PTT  - will need CT scan abdomen and pelvis prior DC

## 2019-09-20 NOTE — PROGRESS NOTE ADULT - ASSESSMENT
This is a 73yo F with medical hx significant for HTN, Uterine CA s/p CHRISTIE, chemotherapy, and radiation x 4 yrs ago, presented to OSH with SOB , found to have Acute submassive PE, transferred to [facility] for further management on 09/16.   Echocardiogram showing no right heart failure   Duplex LE showing left common femoral DVT   Now s/p catheter directed thrombolysis on 9/17/19, completed 24hrs of tPA, sheath are out.     Transitioned to Oral AC   Breathing confortably on room air   Being discharged today per primary team    Plan:  - continue management per primary team

## 2019-09-20 NOTE — PROGRESS NOTE ADULT - PROBLEM SELECTOR PLAN 1
Submassive PE, Bilateral / Saddle, Hemodynamically stable.   Etiology: HX of malignancy with no follow up for last 4 years. Obese but independent in ADL's, former smoker, post menopausal, No recent surgery, travel.  CE negative, BNP elevated, EKG : RBBB. ECHO shows no RH dilation, PASP normal.   DVT left Lower extremity ( CFV, Femoral, Popliteal).  S/p Catheter directed lysis with TPA 09/17, femoral cathter with sheath removed 09/18.   - Transitioned to oral AC on apixaban.   - No desaturations with ambulation.   - High suspicion that etiology of VTE might be related to her hx of cancer perhaps recurrence now. Would need a follow up with heme onc.  - continue supplemental O2 with NC to maintain O2 >94% Submassive PE, Bilateral / Saddle, Hemodynamically stable.   Etiology: HX of malignancy with no follow up for last 4 years. Obese but independent in ADL's, former smoker, post menopausal, No recent surgery, travel.  CE negative, BNP elevated, EKG : RBBB. ECHO shows no RH dilation, PASP normal.   DVT left Lower extremity ( CFV, Femoral, Popliteal).  S/p Catheter directed lysis with TPA 09/17, femoral cathter with sheath removed 09/18.   - Transitioned to oral AC on apixaban.   - No desaturations with ambulation.   - High suspicion that etiology of VTE might be related to her hx of cancer perhaps recurrence now. Would need a follow up with heme onc.  - continue supplemental O2 with NC to maintain O2 >94%  - She needs aggressive incentive spirometry and mobility / OOB to avoid atelectasis.

## 2019-09-20 NOTE — PROGRESS NOTE ADULT - PROBLEM SELECTOR PLAN 2
2/2 submassive PE as above.  hold off anbiotics, trend fever curve.
High suspicion that etiology of VTE might be related to her hx of cancer perhaps recurrence now. she has not really followed up with her primary oncologist since surgery. Would need a follow up with heme onc.  - Check CT abdomen and pelvis for recurrence.
Meeting SIRS criteria at [facility] with T 101.3 WBC 12.44  Lactate 2.4 no clear source. PE likely cause of symptoms.  -No antibiotics at this time  -Sepsis workup if she spikes a fever
Meeting SIRS criteria at Health system with T 101.3 WBC 12.44  Lactate 2.4 no clear source. PE likely cause of symptoms.    -No antibiotics at this time  -Sepsis workup if she spikes a fever
Resolved at rest atleast.  2/2 submassive PE as above.
2/2 submassive PE as above.
Pt presented to [facility] meeting SIRS criteria with T 101.3 WBC 12.44, , lactate 2.4. Unclear source of infection during initial workup. Pt was given tylenol, zithromax, rocephin, asa, 2.7L LR. Though pt met SIRS criteria, the fever, leukocytosis, tachycardia and lactate are most likely secondary to PE  - would not continue antibiotics at this time without clear infectious source  - consider sepsis workup if pt spikes or develops signs/symptoms of infection
Pt presented to [facility] meeting SIRS criteria with T 101.3 WBC 12.44, , lactate 2.4. Unclear source of infection during initial workup. Pt was given tylenol, zithromax, rocephin, asa, 2.7L LR. Though pt met SIRS criteria, the fever, leukocytosis, tachycardia and lactate are most likely secondary to PE  - would not continue antibiotics at this time without clear infectious source  - consider sepsis workup if pt spikes or develops signs/symptoms of infection

## 2019-09-20 NOTE — PROGRESS NOTE ADULT - PROVIDER SPECIALTY LIST ADULT
Internal Medicine
MICU
Pulmonology
Vascular Surgery
MICU

## 2019-09-24 DIAGNOSIS — Z92.21 PERSONAL HISTORY OF ANTINEOPLASTIC CHEMOTHERAPY: ICD-10-CM

## 2019-09-24 DIAGNOSIS — Z90.49 ACQUIRED ABSENCE OF OTHER SPECIFIED PARTS OF DIGESTIVE TRACT: ICD-10-CM

## 2019-09-24 DIAGNOSIS — Z82.49 FAMILY HISTORY OF ISCHEMIC HEART DISEASE AND OTHER DISEASES OF THE CIRCULATORY SYSTEM: ICD-10-CM

## 2019-09-24 DIAGNOSIS — I82.412 ACUTE EMBOLISM AND THROMBOSIS OF LEFT FEMORAL VEIN: ICD-10-CM

## 2019-09-24 DIAGNOSIS — Z92.3 PERSONAL HISTORY OF IRRADIATION: ICD-10-CM

## 2019-09-24 DIAGNOSIS — Z80.8 FAMILY HISTORY OF MALIGNANT NEOPLASM OF OTHER ORGANS OR SYSTEMS: ICD-10-CM

## 2019-09-24 DIAGNOSIS — I26.99 OTHER PULMONARY EMBOLISM WITHOUT ACUTE COR PULMONALE: ICD-10-CM

## 2019-09-24 DIAGNOSIS — I26.92 SADDLE EMBOLUS OF PULMONARY ARTERY WITHOUT ACUTE COR PULMONALE: ICD-10-CM

## 2019-09-24 DIAGNOSIS — J96.00 ACUTE RESPIRATORY FAILURE, UNSPECIFIED WHETHER WITH HYPOXIA OR HYPERCAPNIA: ICD-10-CM

## 2019-09-24 DIAGNOSIS — Z80.0 FAMILY HISTORY OF MALIGNANT NEOPLASM OF DIGESTIVE ORGANS: ICD-10-CM

## 2019-09-24 DIAGNOSIS — R65.10 SYSTEMIC INFLAMMATORY RESPONSE SYNDROME (SIRS) OF NON-INFECTIOUS ORIGIN WITHOUT ACUTE ORGAN DYSFUNCTION: ICD-10-CM

## 2019-09-24 DIAGNOSIS — I10 ESSENTIAL (PRIMARY) HYPERTENSION: ICD-10-CM

## 2019-09-24 DIAGNOSIS — Z85.42 PERSONAL HISTORY OF MALIGNANT NEOPLASM OF OTHER PARTS OF UTERUS: ICD-10-CM

## 2019-09-24 DIAGNOSIS — E66.01 MORBID (SEVERE) OBESITY DUE TO EXCESS CALORIES: ICD-10-CM

## 2019-09-24 DIAGNOSIS — Z90.710 ACQUIRED ABSENCE OF BOTH CERVIX AND UTERUS: ICD-10-CM

## 2019-09-24 DIAGNOSIS — Z87.891 PERSONAL HISTORY OF NICOTINE DEPENDENCE: ICD-10-CM

## 2019-09-25 PROBLEM — Z87.19 PERSONAL HISTORY OF OTHER DISEASES OF THE DIGESTIVE SYSTEM: Chronic | Status: ACTIVE | Noted: 2019-09-16

## 2019-09-25 PROBLEM — I10 ESSENTIAL (PRIMARY) HYPERTENSION: Chronic | Status: ACTIVE | Noted: 2019-09-16

## 2019-09-25 PROBLEM — C55 MALIGNANT NEOPLASM OF UTERUS, PART UNSPECIFIED: Chronic | Status: ACTIVE | Noted: 2019-09-16

## 2019-10-06 PROBLEM — Z00.00 ENCOUNTER FOR PREVENTIVE HEALTH EXAMINATION: Status: ACTIVE | Noted: 2019-10-06

## 2019-10-23 PROCEDURE — 80053 COMPREHEN METABOLIC PANEL: CPT

## 2019-10-23 PROCEDURE — 93970 EXTREMITY STUDY: CPT

## 2019-10-23 PROCEDURE — 84484 ASSAY OF TROPONIN QUANT: CPT

## 2019-10-23 PROCEDURE — 85025 COMPLETE CBC W/AUTO DIFF WBC: CPT

## 2019-10-23 PROCEDURE — 83735 ASSAY OF MAGNESIUM: CPT

## 2019-10-23 PROCEDURE — 93005 ELECTROCARDIOGRAM TRACING: CPT

## 2019-10-23 PROCEDURE — 84443 ASSAY THYROID STIM HORMONE: CPT

## 2019-10-23 PROCEDURE — 86901 BLOOD TYPING SEROLOGIC RH(D): CPT

## 2019-10-23 PROCEDURE — 80061 LIPID PANEL: CPT

## 2019-10-23 PROCEDURE — 83605 ASSAY OF LACTIC ACID: CPT

## 2019-10-23 PROCEDURE — 85384 FIBRINOGEN ACTIVITY: CPT

## 2019-10-23 PROCEDURE — 71045 X-RAY EXAM CHEST 1 VIEW: CPT

## 2019-10-23 PROCEDURE — 74177 CT ABD & PELVIS W/CONTRAST: CPT

## 2019-10-23 PROCEDURE — 82553 CREATINE MB FRACTION: CPT

## 2019-10-23 PROCEDURE — C1769: CPT

## 2019-10-23 PROCEDURE — 86803 HEPATITIS C AB TEST: CPT

## 2019-10-23 PROCEDURE — 94640 AIRWAY INHALATION TREATMENT: CPT

## 2019-10-23 PROCEDURE — C1887: CPT

## 2019-10-23 PROCEDURE — 36415 COLL VENOUS BLD VENIPUNCTURE: CPT

## 2019-10-23 PROCEDURE — 85379 FIBRIN DEGRADATION QUANT: CPT

## 2019-10-23 PROCEDURE — C1751: CPT

## 2019-10-23 PROCEDURE — 84100 ASSAY OF PHOSPHORUS: CPT

## 2019-10-23 PROCEDURE — 80048 BASIC METABOLIC PNL TOTAL CA: CPT

## 2019-10-23 PROCEDURE — 83036 HEMOGLOBIN GLYCOSYLATED A1C: CPT

## 2019-10-23 PROCEDURE — 82550 ASSAY OF CK (CPK): CPT

## 2019-10-23 PROCEDURE — 85027 COMPLETE CBC AUTOMATED: CPT

## 2019-10-23 PROCEDURE — 83880 ASSAY OF NATRIURETIC PEPTIDE: CPT

## 2019-10-23 PROCEDURE — C1894: CPT

## 2019-10-23 PROCEDURE — 86900 BLOOD TYPING SEROLOGIC ABO: CPT

## 2019-10-23 PROCEDURE — 85610 PROTHROMBIN TIME: CPT

## 2019-10-23 PROCEDURE — 97161 PT EVAL LOW COMPLEX 20 MIN: CPT

## 2019-10-23 PROCEDURE — 85730 THROMBOPLASTIN TIME PARTIAL: CPT

## 2019-10-23 PROCEDURE — 93306 TTE W/DOPPLER COMPLETE: CPT

## 2019-10-23 PROCEDURE — 86850 RBC ANTIBODY SCREEN: CPT

## 2019-11-08 ENCOUNTER — APPOINTMENT (OUTPATIENT)
Dept: PULMONOLOGY | Facility: CLINIC | Age: 74
End: 2019-11-08

## 2020-11-11 NOTE — PROGRESS NOTE ADULT - PROBLEM SELECTOR PROBLEM 2
Respiratory failure, acute
Respiratory failure, acute
SIRS (systemic inflammatory response syndrome)
SIRS (systemic inflammatory response syndrome)
Uterine cancer
Respiratory failure, acute
SIRS (systemic inflammatory response syndrome)
SIRS (systemic inflammatory response syndrome)
46

## 2021-06-15 ENCOUNTER — APPOINTMENT (OUTPATIENT)
Dept: HEART AND VASCULAR | Facility: CLINIC | Age: 76
End: 2021-06-15
Payer: MEDICARE

## 2021-06-15 VITALS
BODY MASS INDEX: 46.61 KG/M2 | HEART RATE: 67 BPM | HEIGHT: 66 IN | OXYGEN SATURATION: 96 % | DIASTOLIC BLOOD PRESSURE: 99 MMHG | WEIGHT: 290 LBS | SYSTOLIC BLOOD PRESSURE: 150 MMHG | TEMPERATURE: 97.1 F

## 2021-06-15 DIAGNOSIS — Z86.711 PERSONAL HISTORY OF PULMONARY EMBOLISM: ICD-10-CM

## 2021-06-15 DIAGNOSIS — Z86.79 PERSONAL HISTORY OF OTHER DISEASES OF THE CIRCULATORY SYSTEM: ICD-10-CM

## 2021-06-15 DIAGNOSIS — Z78.9 OTHER SPECIFIED HEALTH STATUS: ICD-10-CM

## 2021-06-15 DIAGNOSIS — Z85.42 PERSONAL HISTORY OF MALIGNANT NEOPLASM OF OTHER PARTS OF UTERUS: ICD-10-CM

## 2021-06-15 PROCEDURE — 93280 PM DEVICE PROGR EVAL DUAL: CPT

## 2021-06-15 RX ORDER — METOPROLOL TARTRATE 25 MG/1
25 TABLET, FILM COATED ORAL
Qty: 60 | Refills: 0 | Status: ACTIVE | COMMUNITY
Start: 2021-05-18

## 2021-06-15 RX ORDER — APIXABAN 5 MG/1
5 TABLET, FILM COATED ORAL
Qty: 60 | Refills: 0 | Status: ACTIVE | COMMUNITY
Start: 2021-06-01

## 2021-06-15 RX ORDER — LISINOPRIL 5 MG/1
5 TABLET ORAL
Qty: 90 | Refills: 0 | Status: ACTIVE | COMMUNITY
Start: 2021-06-11

## 2021-06-15 NOTE — PHYSICAL EXAM
[Left Infraclavicular] : left infraclavicular area [Clean] : clean [Dry] : dry [Healing Well] : healing well [Bleeding] : no active bleeding [Erythema] : not erythematous [Warm] : not warm [Tender] : not tender

## 2021-06-15 NOTE — HISTORY OF PRESENT ILLNESS
[de-identified] : 77 yo female with history of PE, hypertension, uterine CA who had dizziness and syncopal episodes with prolonged pause noted on event monitoring.  She underwent pacemaker implant on 5/17/2021, and returns today for follow-up.\par She has not had any further episodes.  No complaints today.  Device interrogation today shows normal function and one brief episode of atrial fibrillation.

## 2021-08-23 ENCOUNTER — NON-APPOINTMENT (OUTPATIENT)
Age: 76
End: 2021-08-23

## 2021-08-23 ENCOUNTER — APPOINTMENT (OUTPATIENT)
Dept: HEART AND VASCULAR | Facility: CLINIC | Age: 76
End: 2021-08-23
Payer: MEDICARE

## 2021-08-23 PROCEDURE — 93296 REM INTERROG EVL PM/IDS: CPT

## 2021-08-23 PROCEDURE — 93294 REM INTERROG EVL PM/LDLS PM: CPT

## 2021-11-24 ENCOUNTER — APPOINTMENT (OUTPATIENT)
Dept: HEART AND VASCULAR | Facility: CLINIC | Age: 76
End: 2021-11-24
Payer: MEDICARE

## 2021-11-24 ENCOUNTER — NON-APPOINTMENT (OUTPATIENT)
Age: 76
End: 2021-11-24

## 2021-11-24 PROCEDURE — 93296 REM INTERROG EVL PM/IDS: CPT

## 2021-11-24 PROCEDURE — 93294 REM INTERROG EVL PM/LDLS PM: CPT | Mod: NC

## 2022-02-23 ENCOUNTER — APPOINTMENT (OUTPATIENT)
Dept: HEART AND VASCULAR | Facility: CLINIC | Age: 77
End: 2022-02-23
Payer: MEDICARE

## 2022-02-23 ENCOUNTER — NON-APPOINTMENT (OUTPATIENT)
Age: 77
End: 2022-02-23

## 2022-02-23 PROCEDURE — 93296 REM INTERROG EVL PM/IDS: CPT

## 2022-02-23 PROCEDURE — 93294 REM INTERROG EVL PM/LDLS PM: CPT

## 2022-04-07 NOTE — DISCHARGE NOTE NURSING/CASE MANAGEMENT/SOCIAL WORK - PATIENT PORTAL LINK FT
Patient has chronic itching  Daughter gives her benadryl daily when gets really bad  She states she needs to be on H1 or H2 (two antihistamines at the same time)? She is asking for a recommendation as to what she should take and how much?    Corazon Sheth MA You can access the FollowMyHealth Patient Portal offered by [facility] by registering at the following website: [email or web address]. By joining [facility]’s FollowMyHealth portal, you will also be able to view your health information using other applications (apps) compatible with our system.

## 2022-05-25 ENCOUNTER — APPOINTMENT (OUTPATIENT)
Dept: HEART AND VASCULAR | Facility: CLINIC | Age: 77
End: 2022-05-25
Payer: MEDICARE

## 2022-05-25 ENCOUNTER — NON-APPOINTMENT (OUTPATIENT)
Age: 77
End: 2022-05-25

## 2022-05-25 PROCEDURE — 93294 REM INTERROG EVL PM/LDLS PM: CPT

## 2022-05-25 PROCEDURE — 93296 REM INTERROG EVL PM/IDS: CPT

## 2022-06-14 ENCOUNTER — APPOINTMENT (OUTPATIENT)
Dept: HEART AND VASCULAR | Facility: CLINIC | Age: 77
End: 2022-06-14
Payer: MEDICARE

## 2022-06-14 VITALS
OXYGEN SATURATION: 97 % | HEART RATE: 76 BPM | TEMPERATURE: 98.3 F | DIASTOLIC BLOOD PRESSURE: 88 MMHG | BODY MASS INDEX: 47.09 KG/M2 | WEIGHT: 293 LBS | SYSTOLIC BLOOD PRESSURE: 150 MMHG | HEIGHT: 66 IN

## 2022-06-14 DIAGNOSIS — Z95.0 PRESENCE OF CARDIAC PACEMAKER: ICD-10-CM

## 2022-06-14 DIAGNOSIS — I44.30 UNSPECIFIED ATRIOVENTRICULAR BLOCK: ICD-10-CM

## 2022-06-14 PROCEDURE — 99213 OFFICE O/P EST LOW 20 MIN: CPT | Mod: 25

## 2022-06-14 PROCEDURE — 93280 PM DEVICE PROGR EVAL DUAL: CPT

## 2022-06-14 NOTE — HISTORY OF PRESENT ILLNESS
[None] : The patient complains of no symptoms [de-identified] : 77 yo female with history of PE, hypertension, uterine CA who had dizziness and syncopal episodes with prolonged pause noted on event monitoring.  She underwent pacemaker implant on 5/17/2021, and returns today for follow-up.\par She has not had any further episodes.  No complaints today.  Device interrogation today shows normal function and one brief episode of atrial fibrillation.

## 2022-06-14 NOTE — PHYSICAL EXAM
[General Appearance - Well Developed] : well developed [General Appearance - Well Nourished] : well nourished [Heart Rate And Rhythm] : heart rate and rhythm were normal [Heart Sounds] : normal S1 and S2 [Murmurs] : no murmurs present [] : no respiratory distress [Respiration, Rhythm And Depth] : normal respiratory rhythm and effort [Auscultation Breath Sounds / Voice Sounds] : lungs were clear to auscultation bilaterally [Left Infraclavicular] : left infraclavicular area [Clean] : clean [Dry] : dry [Well-Healed] : well-healed [Bleeding] : no active bleeding [Erythema] : not erythematous [Warm] : not warm [Tender] : not tender [Abdomen Soft] : soft [Abdomen Tenderness] : non-tender

## 2022-06-14 NOTE — REVIEW OF SYSTEMS
[Fever] : no fever [Chills] : no chills [SOB] : no shortness of breath [Dyspnea on exertion] : not dyspnea during exertion [Palpitations] : no palpitations [Syncope] : no syncope [Abdominal Pain] : no abdominal pain [Nausea] : no nausea [Vomiting] : no vomiting

## 2022-08-29 ENCOUNTER — NON-APPOINTMENT (OUTPATIENT)
Age: 77
End: 2022-08-29

## 2022-08-29 ENCOUNTER — APPOINTMENT (OUTPATIENT)
Dept: HEART AND VASCULAR | Facility: CLINIC | Age: 77
End: 2022-08-29

## 2022-08-29 PROCEDURE — 93296 REM INTERROG EVL PM/IDS: CPT

## 2022-08-29 PROCEDURE — 93294 REM INTERROG EVL PM/LDLS PM: CPT

## 2022-11-28 ENCOUNTER — NON-APPOINTMENT (OUTPATIENT)
Age: 77
End: 2022-11-28

## 2022-11-28 ENCOUNTER — APPOINTMENT (OUTPATIENT)
Dept: HEART AND VASCULAR | Facility: CLINIC | Age: 77
End: 2022-11-28

## 2022-11-28 PROCEDURE — 93294 REM INTERROG EVL PM/LDLS PM: CPT

## 2022-11-28 PROCEDURE — 93296 REM INTERROG EVL PM/IDS: CPT

## 2023-02-27 ENCOUNTER — APPOINTMENT (OUTPATIENT)
Dept: HEART AND VASCULAR | Facility: CLINIC | Age: 78
End: 2023-02-27
Payer: MEDICARE

## 2023-02-27 ENCOUNTER — NON-APPOINTMENT (OUTPATIENT)
Age: 78
End: 2023-02-27

## 2023-02-27 PROCEDURE — 93294 REM INTERROG EVL PM/LDLS PM: CPT

## 2023-02-27 PROCEDURE — 93296 REM INTERROG EVL PM/IDS: CPT

## 2023-06-13 ENCOUNTER — APPOINTMENT (OUTPATIENT)
Dept: HEART AND VASCULAR | Facility: CLINIC | Age: 78
End: 2023-06-13

## 2023-08-14 ENCOUNTER — NON-APPOINTMENT (OUTPATIENT)
Age: 78
End: 2023-08-14

## 2023-08-14 ENCOUNTER — APPOINTMENT (OUTPATIENT)
Dept: HEART AND VASCULAR | Facility: CLINIC | Age: 78
End: 2023-08-14
Payer: MEDICARE

## 2023-08-15 PROCEDURE — 93296 REM INTERROG EVL PM/IDS: CPT

## 2023-08-15 PROCEDURE — 93294 REM INTERROG EVL PM/LDLS PM: CPT

## 2023-11-17 ENCOUNTER — APPOINTMENT (OUTPATIENT)
Dept: HEART AND VASCULAR | Facility: CLINIC | Age: 78
End: 2023-11-17
Payer: MEDICARE

## 2023-11-17 ENCOUNTER — NON-APPOINTMENT (OUTPATIENT)
Age: 78
End: 2023-11-17

## 2023-11-17 PROCEDURE — 93294 REM INTERROG EVL PM/LDLS PM: CPT

## 2023-11-17 PROCEDURE — 93296 REM INTERROG EVL PM/IDS: CPT

## 2024-02-20 ENCOUNTER — APPOINTMENT (OUTPATIENT)
Dept: HEART AND VASCULAR | Facility: CLINIC | Age: 79
End: 2024-02-20

## 2024-03-26 ENCOUNTER — APPOINTMENT (OUTPATIENT)
Dept: HEART AND VASCULAR | Facility: CLINIC | Age: 79
End: 2024-03-26

## 2024-04-30 ENCOUNTER — APPOINTMENT (OUTPATIENT)
Dept: HEART AND VASCULAR | Facility: CLINIC | Age: 79
End: 2024-04-30

## 2024-06-04 ENCOUNTER — APPOINTMENT (OUTPATIENT)
Dept: HEART AND VASCULAR | Facility: CLINIC | Age: 79
End: 2024-06-04

## 2024-07-08 ENCOUNTER — NON-APPOINTMENT (OUTPATIENT)
Age: 79
End: 2024-07-08

## 2024-07-09 ENCOUNTER — APPOINTMENT (OUTPATIENT)
Dept: HEART AND VASCULAR | Facility: CLINIC | Age: 79
End: 2024-07-09

## 2024-07-09 PROCEDURE — 93296 REM INTERROG EVL PM/IDS: CPT

## 2024-07-09 PROCEDURE — 93294 REM INTERROG EVL PM/LDLS PM: CPT

## 2024-10-14 ENCOUNTER — APPOINTMENT (OUTPATIENT)
Dept: HEART AND VASCULAR | Facility: CLINIC | Age: 79
End: 2024-10-14

## 2024-10-14 PROCEDURE — 93294 REM INTERROG EVL PM/LDLS PM: CPT

## 2024-10-14 PROCEDURE — 93296 REM INTERROG EVL PM/IDS: CPT

## 2025-01-15 ENCOUNTER — NON-APPOINTMENT (OUTPATIENT)
Age: 80
End: 2025-01-15

## 2025-01-15 ENCOUNTER — APPOINTMENT (OUTPATIENT)
Dept: HEART AND VASCULAR | Facility: CLINIC | Age: 80
End: 2025-01-15
Payer: MEDICARE

## 2025-01-15 PROCEDURE — 93296 REM INTERROG EVL PM/IDS: CPT

## 2025-01-15 PROCEDURE — 93294 REM INTERROG EVL PM/LDLS PM: CPT

## 2025-02-27 NOTE — PROGRESS NOTE ADULT - PROBLEM SELECTOR PLAN 3
[FreeTextEntry1] :  Scribed by Caleb Cornejo for Dr. Henderson Feb 27 2025  4:20PM
High suspicion that etiology of VTE might be related to her hx of cancer perhaps recurrance now. would need a follow up with heme onc.
High suspicion that etiology of VTE might be related to her hx of cancer perhaps recurrence now. would need a follow up with heme onc.
History of hypertension previously on medications, stopped 3 years ago and hasn't followed up with a PCP. BP at [facility] 183/90.  - started on lisinopril 5mg po qhs  - continue to monitor BP
History of hypertension previously on medications, stopped 3 years ago and hasn't followed up with a PCP. BP at [facility] 183/90.    -Monitor BP  -Consider starting anti-hypertensives
High suspicion that etiology of VTE might be related to her hx of cancer perhaps recurrence now. would need a follow up with heme onc.
Pt reports worsening lower extremity swelling of legs ever since radiation therapy for her uterine cancer, last treatment in 2015.  - L>R lower extremity erythema and warmth; both 2+ pitting  - possible DVT for cause of PE vs cellulitis  - L lower extremity +partially occlusive and occlusive thrombus involving L common femoral, femoral, and popliteal vein.
Pt reports worsening lower extremity swelling of legs ever since radiation therapy for her uterine cancer, last treatment in 2015.  - L>R lower extremity erythema and warmth; both 2+ pitting  - possible DVT for cause of PE vs cellulitis  - pending results for doppler U/S

## 2025-04-08 ENCOUNTER — APPOINTMENT (OUTPATIENT)
Dept: HEART AND VASCULAR | Facility: CLINIC | Age: 80
End: 2025-04-08

## 2025-04-16 ENCOUNTER — APPOINTMENT (OUTPATIENT)
Dept: HEART AND VASCULAR | Facility: CLINIC | Age: 80
End: 2025-04-16

## 2025-05-16 ENCOUNTER — APPOINTMENT (OUTPATIENT)
Dept: CARDIOLOGY | Facility: CLINIC | Age: 80
End: 2025-05-16

## 2025-05-19 ENCOUNTER — APPOINTMENT (OUTPATIENT)
Dept: HEART AND VASCULAR | Facility: CLINIC | Age: 80
End: 2025-05-19
Payer: MEDICARE

## 2025-05-19 ENCOUNTER — NON-APPOINTMENT (OUTPATIENT)
Age: 80
End: 2025-05-19

## 2025-05-19 PROCEDURE — 93296 REM INTERROG EVL PM/IDS: CPT

## 2025-05-19 PROCEDURE — 93294 REM INTERROG EVL PM/LDLS PM: CPT

## 2025-08-18 ENCOUNTER — NON-APPOINTMENT (OUTPATIENT)
Age: 80
End: 2025-08-18

## 2025-08-18 ENCOUNTER — APPOINTMENT (OUTPATIENT)
Dept: HEART AND VASCULAR | Facility: CLINIC | Age: 80
End: 2025-08-18
Payer: MEDICARE

## 2025-08-18 PROCEDURE — 93294 REM INTERROG EVL PM/LDLS PM: CPT

## 2025-08-18 PROCEDURE — 93296 REM INTERROG EVL PM/IDS: CPT
